# Patient Record
Sex: MALE | Race: WHITE | NOT HISPANIC OR LATINO
[De-identification: names, ages, dates, MRNs, and addresses within clinical notes are randomized per-mention and may not be internally consistent; named-entity substitution may affect disease eponyms.]

---

## 2020-12-29 ENCOUNTER — RESULT REVIEW (OUTPATIENT)
Age: 55
End: 2020-12-29

## 2020-12-29 PROCEDURE — 88321 CONSLTJ&REPRT SLD PREP ELSWR: CPT

## 2021-01-11 PROBLEM — Z00.00 ENCOUNTER FOR PREVENTIVE HEALTH EXAMINATION: Status: ACTIVE | Noted: 2021-01-11

## 2021-01-12 ENCOUNTER — APPOINTMENT (OUTPATIENT)
Dept: OTOLARYNGOLOGY | Facility: CLINIC | Age: 56
End: 2021-01-12

## 2021-01-12 ENCOUNTER — APPOINTMENT (OUTPATIENT)
Dept: OTOLARYNGOLOGY | Facility: CLINIC | Age: 56
End: 2021-01-12
Payer: COMMERCIAL

## 2021-01-12 VITALS
HEIGHT: 70 IN | HEART RATE: 76 BPM | SYSTOLIC BLOOD PRESSURE: 121 MMHG | WEIGHT: 175 LBS | BODY MASS INDEX: 25.05 KG/M2 | DIASTOLIC BLOOD PRESSURE: 77 MMHG

## 2021-01-12 VITALS — TEMPERATURE: 97.3 F

## 2021-01-12 PROCEDURE — 99204 OFFICE O/P NEW MOD 45 MIN: CPT

## 2021-01-12 PROCEDURE — 99072 ADDL SUPL MATRL&STAF TM PHE: CPT

## 2021-01-12 NOTE — PHYSICAL EXAM
[Midline] : trachea located in midline position [Normal] : no rashes [de-identified] : 3 cm R tonsil mass which has a central ulceration, probably from the biopsy. Lesion extends submucosally to the uvula and soft palate and laterally to the medial aspect of the retromolar trigone. No extension into the BOT.

## 2021-01-12 NOTE — HISTORY OF PRESENT ILLNESS
[de-identified] : 55 yro pt referred by Dr. Zazueta for HPV-related Right tonsil SCC. This was found on biopsy done 12/29/2020. \par CT neck from [facility] 12/18/2020 showed masslike enlargement of the R palatine tonsil measuring approx 2.6 x 2.6 x 2.3cm partial effacement of the R parapharyngeal space and protrusion of R tonsil into the oropharynx. Increased number of subcentimeter lymph nodes. \par Pt was c/o sore throat and Right earache and was on antibiotics with limited relief since November 2019. Finally went to ENT. Today pt still with these symptoms. Also with dysphagia and is on soft diet but weight is stable. As per wife, voice is lower since biopsy.  No dyspnea, fever, chills, cough. Weight stable. \par Pt quite smoking 22 years ago, was smoking 1pack/day for 15 years.

## 2021-01-12 NOTE — REASON FOR VISIT
[Initial Evaluation] : an initial evaluation for [Spouse] : spouse [FreeTextEntry2] : Right tonsil SCC

## 2021-01-14 ENCOUNTER — NON-APPOINTMENT (OUTPATIENT)
Age: 56
End: 2021-01-14

## 2021-01-23 ENCOUNTER — TRANSCRIPTION ENCOUNTER (OUTPATIENT)
Age: 56
End: 2021-01-23

## 2021-01-23 ENCOUNTER — OUTPATIENT (OUTPATIENT)
Dept: OUTPATIENT SERVICES | Facility: HOSPITAL | Age: 56
LOS: 1 days | Discharge: HOME | End: 2021-01-23

## 2021-01-23 DIAGNOSIS — C09.9 MALIGNANT NEOPLASM OF TONSIL, UNSPECIFIED: ICD-10-CM

## 2021-01-26 ENCOUNTER — OUTPATIENT (OUTPATIENT)
Dept: OUTPATIENT SERVICES | Facility: HOSPITAL | Age: 56
LOS: 1 days | Discharge: HOME | End: 2021-01-26
Payer: COMMERCIAL

## 2021-01-26 ENCOUNTER — APPOINTMENT (OUTPATIENT)
Dept: RADIATION ONCOLOGY | Facility: CLINIC | Age: 56
End: 2021-01-26
Payer: COMMERCIAL

## 2021-01-26 VITALS — BODY MASS INDEX: 25.22 KG/M2 | WEIGHT: 175.8 LBS

## 2021-01-26 DIAGNOSIS — T14.8XXA OTHER INJURY OF UNSPECIFIED BODY REGION, INITIAL ENCOUNTER: ICD-10-CM

## 2021-01-26 DIAGNOSIS — Z80.0 FAMILY HISTORY OF MALIGNANT NEOPLASM OF DIGESTIVE ORGANS: ICD-10-CM

## 2021-01-26 DIAGNOSIS — Z78.9 OTHER SPECIFIED HEALTH STATUS: ICD-10-CM

## 2021-01-26 DIAGNOSIS — Z87.891 PERSONAL HISTORY OF NICOTINE DEPENDENCE: ICD-10-CM

## 2021-01-26 DIAGNOSIS — Z80.42 FAMILY HISTORY OF MALIGNANT NEOPLASM OF PROSTATE: ICD-10-CM

## 2021-01-26 PROCEDURE — 77263 THER RADIOLOGY TX PLNG CPLX: CPT

## 2021-01-26 PROCEDURE — 92511 NASOPHARYNGOSCOPY: CPT

## 2021-01-26 PROCEDURE — 99205 OFFICE O/P NEW HI 60 MIN: CPT

## 2021-01-26 PROCEDURE — 99072 ADDL SUPL MATRL&STAF TM PHE: CPT

## 2021-01-26 NOTE — PHYSICAL EXAM
[Normal] : oriented to person, place and time, the affect was normal, the mood was normal and not anxious [de-identified] :  3cm ulcerative mass in right tonsillar fossa.

## 2021-01-26 NOTE — LETTER CLOSING
[Consult Closing:] : Thank you for allowing me to participate in the care of this patient.  If you have any questions, please do not hesitate to contact me. [Sincerely yours,] : Sincerely yours, [FreeTextEntry3] : Yassine Valenzuela MD\par Physician in Chief\par Department of Radiation Medicine\par [facility] [place]\par [facility] [place]\par \par  of Radiation Medicine\par [facility]\par at  [facility]\par \par Radiation \par [facility]/\par [facility] [place]\par [address]\par [place], [place] [address]\par \par Tel: [phone]\par Fax: [phone]\par

## 2021-01-26 NOTE — VITALS
[Least Pain Intensity: 0/10] : 0/10 [Pain Description/Quality: ___] : Pain description/quality: [unfilled] [Pain Duration: ___] : Pain duration: [unfilled] [Pain Location: ___] : Pain Location: [unfilled] [OTC] : OTC [80: Normal activity with effort; some signs or symptoms of disease.] : 80: Normal activity with effort; some signs or symptoms of disease.  [ECOG Performance Status: 1 - Restricted in physically strenuous activity but ambulatory and able to carry out work of a light or sedentary nature] : Performance Status: 1 - Restricted in physically strenuous activity but ambulatory and able to carry out work of a light or sedentary nature, e.g., light house work, office work [Maximal Pain Intensity: 3/10] : 3/10 [Pain Interferes with ADLs] : Pain does not interfere with activities of daily living

## 2021-01-26 NOTE — LETTER GREETING
[Dear Doctor] : Dear Doctor, [Consult Letter:] : Your patient, [unfilled] was seen in my office today for consultation. [Please see my note below.] : Please see my note below. [FreeTextEntry2] : Michael De La Cruz MD\par Abbi Ramon MD\par

## 2021-01-26 NOTE — DISEASE MANAGEMENT
[Clinical] : TNM Stage: c [II] : II [FreeTextEntry4] : HPV related squamous cell carcinoma [TTNM] : 2 [MTNM] : 0 [NTNM] : 2 [de-identified] : 7000cGy [de-identified] : tonsil / necks

## 2021-01-26 NOTE — HISTORY OF PRESENT ILLNESS
[FreeTextEntry1] :  This 55 year-old man is being seen for radiation medicine consultation.  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  \par \par The patient presented to Dr. Zazueta with complaints of sore throat and right ear ache since 11/2019 for which antibiotics provided limited relief.  \par \par CT neck 12/18/2020 ([facility]):\par masslike enlargement of the Right palatine tonsil measuring approximately 2.6 x 2.6 x 2.3 cm,  partial effacement of the Right parapharyngeal space and protrusion of Right tonsil into the oropharynx.  Increased number of subcentimeter lymph nodes. \par  \par Mr. Larson was referred to Dr. De La Cruz who performed biopsy.  Patient also reported dysphagia and is on soft diet but weight is stable. As per wife, voice is lower since biopsy.  Denies dyspnea, fever, chills, cough.\par \par \par

## 2021-01-26 NOTE — PROCEDURE
[Lesion] : lesion identified by mirror examination needing further evaluation [Topical Lidocaine] : topical lidocaine [Flexible Endoscope] : examined with the flexible endoscope [Serial Number: ___] : Serial Number: [unfilled] [Photographs Taken] : photographs taken [Lesion(s)] : lesion(s) [Normal] : the true vocal cords ~T were normal [de-identified] : right tonsillar ulcerative mass

## 2021-01-26 NOTE — REVIEW OF SYSTEMS
[Dysphagia] : no dysphagia [Hoarseness] : no hoarseness [Odynophagia] : no odynophagia [Mucosal Pain] : no mucosal pain [Abdominal Pain] : no abdominal pain [Negative] : Neurological [FreeTextEntry4] : mildly sore throat radiates to right ear  [FreeTextEntry7] : Hx stomach ulcer [FreeTextEntry9] : multiple orthopedic surgeries, recent torn right shoulder ligament [de-identified] : claustrophobia

## 2021-01-27 ENCOUNTER — RESULT REVIEW (OUTPATIENT)
Age: 56
End: 2021-01-27

## 2021-01-27 ENCOUNTER — APPOINTMENT (OUTPATIENT)
Dept: HEMATOLOGY ONCOLOGY | Facility: CLINIC | Age: 56
End: 2021-01-27
Payer: COMMERCIAL

## 2021-01-27 VITALS
SYSTOLIC BLOOD PRESSURE: 120 MMHG | OXYGEN SATURATION: 98 % | HEIGHT: 70 IN | BODY MASS INDEX: 25.2 KG/M2 | WEIGHT: 176 LBS | DIASTOLIC BLOOD PRESSURE: 77 MMHG | TEMPERATURE: 98.7 F | HEART RATE: 64 BPM

## 2021-01-27 LAB
BASOPHILS # BLD AUTO: 0.1 K/UL — SIGNIFICANT CHANGE UP (ref 0–0.2)
BASOPHILS NFR BLD AUTO: 0.9 % — SIGNIFICANT CHANGE UP (ref 0–2)
EOSINOPHIL # BLD AUTO: 0.2 K/UL — SIGNIFICANT CHANGE UP (ref 0–0.5)
EOSINOPHIL NFR BLD AUTO: 2.9 % — SIGNIFICANT CHANGE UP (ref 0–6)
HCT VFR BLD CALC: 45.9 % — SIGNIFICANT CHANGE UP (ref 39–50)
HGB BLD-MCNC: 14.7 G/DL — SIGNIFICANT CHANGE UP (ref 13–17)
LYMPHOCYTES # BLD AUTO: 1.8 K/UL — SIGNIFICANT CHANGE UP (ref 1–3.3)
LYMPHOCYTES # BLD AUTO: 26.4 % — SIGNIFICANT CHANGE UP (ref 13–44)
MCHC RBC-ENTMCNC: 28.7 PG — SIGNIFICANT CHANGE UP (ref 27–34)
MCHC RBC-ENTMCNC: 32.1 G/DL — SIGNIFICANT CHANGE UP (ref 32–36)
MCV RBC AUTO: 89.3 FL — SIGNIFICANT CHANGE UP (ref 80–100)
MONOCYTES # BLD AUTO: 0.5 K/UL — SIGNIFICANT CHANGE UP (ref 0–0.9)
MONOCYTES NFR BLD AUTO: 7.9 % — SIGNIFICANT CHANGE UP (ref 2–14)
NEUTROPHILS # BLD AUTO: 4.1 K/UL — SIGNIFICANT CHANGE UP (ref 1.8–7.4)
NEUTROPHILS NFR BLD AUTO: 61.9 % — SIGNIFICANT CHANGE UP (ref 43–77)
PLATELET # BLD AUTO: 182 K/UL — SIGNIFICANT CHANGE UP (ref 150–400)
RBC # BLD: 5.14 M/UL — SIGNIFICANT CHANGE UP (ref 4.2–5.8)
RBC # FLD: 12.6 % — SIGNIFICANT CHANGE UP (ref 10.3–14.5)
SURGICAL PATHOLOGY STUDY: SIGNIFICANT CHANGE UP
WBC # BLD: 6.7 K/UL — SIGNIFICANT CHANGE UP (ref 3.8–10.5)
WBC # FLD AUTO: 6.7 K/UL — SIGNIFICANT CHANGE UP (ref 3.8–10.5)

## 2021-01-27 PROCEDURE — 99072 ADDL SUPL MATRL&STAF TM PHE: CPT

## 2021-01-27 PROCEDURE — 99205 OFFICE O/P NEW HI 60 MIN: CPT

## 2021-01-27 NOTE — HISTORY OF PRESENT ILLNESS
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  Patient c/o a sore throat and right otalgia since 11/20 with no relief from antibiotics.  He saw ENT, Dr. Yassine Zazueta, and also c/o dysphagia.  Saw ENT following no improvement in Nov after antibiotics and had trial second antibiotics.   CT neck showed masslike enlargement of the right palatine tonsil measuring 2.6 x 2.6 x 2.3 cm with partial effacement of the right parapharyngeal space and protrusion of the right tonsil into the oropharynx.  Increased number of B/L cervical LNs measuring less than 1 cm.  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  LVI and PNI not identified.  He saw Dr. Michael De La Cruz where a physical exam showed a 3 cm R tonsil mass which has a central ulceration, probably from the biopsy. Lesion extends submucosally to the uvula and soft palate and laterally to the medial aspect of the retromolar trigone. No extension into the BOT.  Staging PET pending. [de-identified] : He reports dull/ sharp pain right ear intermittent radiating to right side neck x 3 months.

## 2021-01-27 NOTE — RESULTS/DATA
[FreeTextEntry1] : 12/29/20 Pathology:\par HPV-related SCC.  LVI and PNI not identified \par \par 12/18/20 CT Neck:\par masslike enlargement of the right palatine tonsil measuring 2.6 x 2.6 x 2.3 cm with partial effacement of the right parapharyngeal space and protrusion of the right tonsil into the oropharynx.  Increased number of B/L cervical LNs measuring less than 1 cm.

## 2021-01-27 NOTE — CONSULT LETTER
[Dear  ___] : Dear  [unfilled], [Consult Letter:] : I had the pleasure of evaluating your patient, [unfilled]. [Please see my note below.] : Please see my note below. [Consult Closing:] : Thank you very much for allowing me to participate in the care of this patient.  If you have any questions, please do not hesitate to contact me. [Sincerely,] : Sincerely, [DrTamy  ___] : Dr. ROSA [FreeTextEntry3] : Dr. Abbi Ramon

## 2021-01-28 LAB
ALBUMIN SERPL ELPH-MCNC: 4.6 G/DL
ALP BLD-CCNC: 91 U/L
ALT SERPL-CCNC: 47 U/L
ANION GAP SERPL CALC-SCNC: 12 MMOL/L
AST SERPL-CCNC: 39 U/L
BILIRUB SERPL-MCNC: 0.8 MG/DL
BUN SERPL-MCNC: 21 MG/DL
CALCIUM SERPL-MCNC: 10 MG/DL
CHLORIDE SERPL-SCNC: 101 MMOL/L
CO2 SERPL-SCNC: 25 MMOL/L
CREAT SERPL-MCNC: 1.02 MG/DL
GLUCOSE SERPL-MCNC: 93 MG/DL
HBV CORE IGG+IGM SER QL: NONREACTIVE
HBV SURFACE AB SER QL: NONREACTIVE
HBV SURFACE AG SER QL: NONREACTIVE
HCV AB SER QL: NONREACTIVE
HCV S/CO RATIO: 0.12 S/CO
INR PPP: 0.96 RATIO
MAGNESIUM SERPL-MCNC: 2.2 MG/DL
POTASSIUM SERPL-SCNC: 4.5 MMOL/L
PROT SERPL-MCNC: 7.2 G/DL
PT BLD: 11.3 SEC
SODIUM SERPL-SCNC: 138 MMOL/L
T3FREE SERPL-MCNC: 3.23 PG/ML
T4 FREE SERPL-MCNC: 1.2 NG/DL

## 2021-01-29 ENCOUNTER — APPOINTMENT (OUTPATIENT)
Dept: GASTROENTEROLOGY | Facility: CLINIC | Age: 56
End: 2021-01-29
Payer: COMMERCIAL

## 2021-01-29 VITALS
WEIGHT: 176 LBS | SYSTOLIC BLOOD PRESSURE: 120 MMHG | DIASTOLIC BLOOD PRESSURE: 70 MMHG | TEMPERATURE: 98 F | OXYGEN SATURATION: 97 % | BODY MASS INDEX: 25.2 KG/M2 | RESPIRATION RATE: 15 BRPM | HEIGHT: 70 IN | HEART RATE: 60 BPM

## 2021-01-29 DIAGNOSIS — R13.12 DYSPHAGIA, OROPHARYNGEAL PHASE: ICD-10-CM

## 2021-01-29 PROCEDURE — 99204 OFFICE O/P NEW MOD 45 MIN: CPT

## 2021-01-29 PROCEDURE — 99072 ADDL SUPL MATRL&STAF TM PHE: CPT

## 2021-01-29 RX ORDER — LEVOFLOXACIN 500 MG/1
500 TABLET, FILM COATED ORAL
Qty: 10 | Refills: 0 | Status: DISCONTINUED | COMMUNITY
Start: 2020-11-05

## 2021-01-29 NOTE — CONSULT LETTER
[Dear  ___] : Dear  [unfilled], [Consult Letter:] : I had the pleasure of evaluating your patient, [unfilled]. [Please see my note below.] : Please see my note below. [Consult Closing:] : Thank you very much for allowing me to participate in the care of this patient.  If you have any questions, please do not hesitate to contact me. [Sincerely,] : Sincerely, [FreeTextEntry1] : Squamous cell CA of the right tonsil with cervical adenopathy bilateral. Already with pharyngeal dysphagia. Reasonable PEG candidate. Offered same. Explained procedure. Considering. [FreeTextEntry3] : Kyle Sarabia MD FACG\par Diplomate American Board of Internal Medicine and Gastroenterolgy\par [facility]\par

## 2021-01-29 NOTE — PHYSICAL EXAM
[General Appearance - Alert] : alert [General Appearance - In No Acute Distress] : in no acute distress [Sclera] : the sclera and conjunctiva were normal [PERRL With Normal Accommodation] : pupils were equal in size, round, and reactive to light [Extraocular Movements] : extraocular movements were intact [Outer Ear] : the ears and nose were normal in appearance [Oropharynx] : the oropharynx was normal [Neck Cervical Mass (___cm)] : no neck mass was observed [Neck Appearance] : the appearance of the neck was normal [Jugular Venous Distention Increased] : there was no jugular-venous distention [Thyroid Diffuse Enlargement] : the thyroid was not enlarged [Thyroid Nodule] : there were no palpable thyroid nodules [Auscultation Breath Sounds / Voice Sounds] : lungs were clear to auscultation bilaterally [Heart Rate And Rhythm] : heart rate was normal and rhythm regular [Heart Sounds] : normal S1 and S2 [Heart Sounds Gallop] : no gallops [Murmurs] : no murmurs [Heart Sounds Pericardial Friction Rub] : no pericardial rub [Bowel Sounds] : normal bowel sounds [Abdomen Soft] : soft [Abdomen Tenderness] : non-tender [Abdomen Mass (___ Cm)] : no abdominal mass palpated [No CVA Tenderness] : no ~M costovertebral angle tenderness [No Spinal Tenderness] : no spinal tenderness [Abnormal Walk] : normal gait [Nail Clubbing] : no clubbing  or cyanosis of the fingernails [Musculoskeletal - Swelling] : no joint swelling seen [Motor Tone] : muscle strength and tone were normal [Skin Color & Pigmentation] : normal skin color and pigmentation [Skin Turgor] : normal skin turgor [] : no rash [FreeTextEntry1] : No adenopathy. No HSM.

## 2021-01-29 NOTE — ASSESSMENT
[FreeTextEntry1] : Stage III head and neck/tonsillar  squamous cell carcinoma. Not a surgical candidate. Awaiting radiochemotherapy. Modest dysphagia currently. Expect treatment induced mucositis.  Reasonable candidate for a prophylactic PEG.\par Explained the procedure, its indications risks and benefits. Patient understands and is considering his options. Current blood work is acceptable. Patient is allergic to penicillin; therefore clindamycin would be the likely prophylactic antibiotic.\par Patient informed about care of the G-tube and it's eventual removal.\par Patient wishes to consider his options before committing to the endoscopy/PEG.  \par Call back as needed.

## 2021-01-29 NOTE — HISTORY OF PRESENT ILLNESS
[FreeTextEntry1] : 55-year-old white male distant smoker. Hasn't smoked in the past 20 years. Develop right tonsillar pain and flow volume esophageal dysphagia about mid November appeared response to antibiotics. Biopsy proven to be squamous cell carcinoma. Low T. stage III. Recent CAT scan suggested bilateral lymphadenopathy along with right tonsillar enlargement recent blood work was normal. Hepatitis profiles were negative. CAT scan completed yesterday shows increased uptake in the right tonsil and bilateral cervical adenopathy noted. No distant activity.\par Planned for radiochemotherapy.\par Diet has been adjusted to soft and maintaining adequate hydration and nutrition. No weight loss. No bleeding. No neck swelling. No prior abdominal surgery.

## 2021-02-02 ENCOUNTER — NON-APPOINTMENT (OUTPATIENT)
Age: 56
End: 2021-02-02

## 2021-02-05 PROCEDURE — 77470 SPECIAL RADIATION TREATMENT: CPT | Mod: 26

## 2021-02-10 PROCEDURE — 77338 DESIGN MLC DEVICE FOR IMRT: CPT | Mod: 26

## 2021-02-10 PROCEDURE — 77300 RADIATION THERAPY DOSE PLAN: CPT | Mod: 26

## 2021-02-10 PROCEDURE — 77301 RADIOTHERAPY DOSE PLAN IMRT: CPT | Mod: 26

## 2021-02-17 ENCOUNTER — APPOINTMENT (OUTPATIENT)
Dept: HEMATOLOGY ONCOLOGY | Facility: CLINIC | Age: 56
End: 2021-02-17

## 2021-02-17 ENCOUNTER — LABORATORY RESULT (OUTPATIENT)
Age: 56
End: 2021-02-17

## 2021-02-22 ENCOUNTER — RESULT REVIEW (OUTPATIENT)
Age: 56
End: 2021-02-22

## 2021-02-22 ENCOUNTER — OUTPATIENT (OUTPATIENT)
Dept: OUTPATIENT SERVICES | Facility: HOSPITAL | Age: 56
LOS: 1 days | Discharge: HOME | End: 2021-02-22
Payer: COMMERCIAL

## 2021-02-22 ENCOUNTER — APPOINTMENT (OUTPATIENT)
Dept: HEMATOLOGY ONCOLOGY | Facility: CLINIC | Age: 56
End: 2021-02-22

## 2021-02-22 DIAGNOSIS — C09.9 MALIGNANT NEOPLASM OF TONSIL, UNSPECIFIED: ICD-10-CM

## 2021-02-22 LAB
BASOPHILS # BLD AUTO: 0.1 K/UL — SIGNIFICANT CHANGE UP (ref 0–0.2)
BASOPHILS NFR BLD AUTO: 1.6 % — SIGNIFICANT CHANGE UP (ref 0–2)
EOSINOPHIL # BLD AUTO: 0.3 K/UL — SIGNIFICANT CHANGE UP (ref 0–0.5)
EOSINOPHIL NFR BLD AUTO: 4.8 % — SIGNIFICANT CHANGE UP (ref 0–6)
HCT VFR BLD CALC: 47.7 % — SIGNIFICANT CHANGE UP (ref 39–50)
HGB BLD-MCNC: 15.7 G/DL — SIGNIFICANT CHANGE UP (ref 13–17)
LYMPHOCYTES # BLD AUTO: 2 K/UL — SIGNIFICANT CHANGE UP (ref 1–3.3)
LYMPHOCYTES # BLD AUTO: 30.8 % — SIGNIFICANT CHANGE UP (ref 13–44)
MCHC RBC-ENTMCNC: 28.6 PG — SIGNIFICANT CHANGE UP (ref 27–34)
MCHC RBC-ENTMCNC: 32.9 G/DL — SIGNIFICANT CHANGE UP (ref 32–36)
MCV RBC AUTO: 87 FL — SIGNIFICANT CHANGE UP (ref 80–100)
MONOCYTES # BLD AUTO: 0.6 K/UL — SIGNIFICANT CHANGE UP (ref 0–0.9)
MONOCYTES NFR BLD AUTO: 9.5 % — SIGNIFICANT CHANGE UP (ref 2–14)
NEUTROPHILS # BLD AUTO: 3.5 K/UL — SIGNIFICANT CHANGE UP (ref 1.8–7.4)
NEUTROPHILS NFR BLD AUTO: 53.3 % — SIGNIFICANT CHANGE UP (ref 43–77)
PLATELET # BLD AUTO: 184 K/UL — SIGNIFICANT CHANGE UP (ref 150–400)
RBC # BLD: 5.48 M/UL — SIGNIFICANT CHANGE UP (ref 4.2–5.8)
RBC # FLD: 12 % — SIGNIFICANT CHANGE UP (ref 10.3–14.5)
WBC # BLD: 6.5 K/UL — SIGNIFICANT CHANGE UP (ref 3.8–10.5)
WBC # FLD AUTO: 6.5 K/UL — SIGNIFICANT CHANGE UP (ref 3.8–10.5)

## 2021-02-23 ENCOUNTER — APPOINTMENT (OUTPATIENT)
Age: 56
End: 2021-02-23

## 2021-02-23 ENCOUNTER — LABORATORY RESULT (OUTPATIENT)
Age: 56
End: 2021-02-23

## 2021-02-23 PROCEDURE — 77387B: CUSTOM | Mod: 26

## 2021-02-23 PROCEDURE — 93010 ELECTROCARDIOGRAM REPORT: CPT

## 2021-02-24 DIAGNOSIS — R11.2 NAUSEA WITH VOMITING, UNSPECIFIED: ICD-10-CM

## 2021-02-24 DIAGNOSIS — C76.0 MALIGNANT NEOPLASM OF HEAD, FACE AND NECK: ICD-10-CM

## 2021-02-24 DIAGNOSIS — Z51.11 ENCOUNTER FOR ANTINEOPLASTIC CHEMOTHERAPY: ICD-10-CM

## 2021-02-24 PROCEDURE — 77387B: CUSTOM | Mod: 26

## 2021-02-25 PROCEDURE — 77387B: CUSTOM | Mod: 26

## 2021-02-26 PROCEDURE — 77387B: CUSTOM | Mod: 26

## 2021-03-01 ENCOUNTER — APPOINTMENT (OUTPATIENT)
Dept: HEMATOLOGY ONCOLOGY | Facility: CLINIC | Age: 56
End: 2021-03-01

## 2021-03-01 ENCOUNTER — RESULT REVIEW (OUTPATIENT)
Age: 56
End: 2021-03-01

## 2021-03-01 ENCOUNTER — NON-APPOINTMENT (OUTPATIENT)
Age: 56
End: 2021-03-01

## 2021-03-01 VITALS
HEART RATE: 104 BPM | BODY MASS INDEX: 25.05 KG/M2 | OXYGEN SATURATION: 98 % | SYSTOLIC BLOOD PRESSURE: 149 MMHG | DIASTOLIC BLOOD PRESSURE: 86 MMHG | HEIGHT: 70 IN | WEIGHT: 175 LBS | RESPIRATION RATE: 15 BRPM | TEMPERATURE: 98 F

## 2021-03-01 LAB
BASOPHILS # BLD AUTO: 0.1 K/UL — SIGNIFICANT CHANGE UP (ref 0–0.2)
BASOPHILS NFR BLD AUTO: 0.8 % — SIGNIFICANT CHANGE UP (ref 0–2)
EOSINOPHIL # BLD AUTO: 0.2 K/UL — SIGNIFICANT CHANGE UP (ref 0–0.5)
EOSINOPHIL NFR BLD AUTO: 2.4 % — SIGNIFICANT CHANGE UP (ref 0–6)
HCT VFR BLD CALC: 45.8 % — SIGNIFICANT CHANGE UP (ref 39–50)
HGB BLD-MCNC: 15.1 G/DL — SIGNIFICANT CHANGE UP (ref 13–17)
LYMPHOCYTES # BLD AUTO: 1.3 K/UL — SIGNIFICANT CHANGE UP (ref 1–3.3)
LYMPHOCYTES # BLD AUTO: 17.8 % — SIGNIFICANT CHANGE UP (ref 13–44)
MCHC RBC-ENTMCNC: 27.9 PG — SIGNIFICANT CHANGE UP (ref 27–34)
MCHC RBC-ENTMCNC: 33 G/DL — SIGNIFICANT CHANGE UP (ref 32–36)
MCV RBC AUTO: 84.4 FL — SIGNIFICANT CHANGE UP (ref 80–100)
MONOCYTES # BLD AUTO: 0.6 K/UL — SIGNIFICANT CHANGE UP (ref 0–0.9)
MONOCYTES NFR BLD AUTO: 7.4 % — SIGNIFICANT CHANGE UP (ref 2–14)
NEUTROPHILS # BLD AUTO: 5.3 K/UL — SIGNIFICANT CHANGE UP (ref 1.8–7.4)
NEUTROPHILS NFR BLD AUTO: 71.6 % — SIGNIFICANT CHANGE UP (ref 43–77)
PLATELET # BLD AUTO: 176 K/UL — SIGNIFICANT CHANGE UP (ref 150–400)
RBC # BLD: 5.43 M/UL — SIGNIFICANT CHANGE UP (ref 4.2–5.8)
RBC # FLD: 11.5 % — SIGNIFICANT CHANGE UP (ref 10.3–14.5)
WBC # BLD: 7.4 K/UL — SIGNIFICANT CHANGE UP (ref 3.8–10.5)
WBC # FLD AUTO: 7.4 K/UL — SIGNIFICANT CHANGE UP (ref 3.8–10.5)

## 2021-03-01 PROCEDURE — 77427 RADIATION TX MANAGEMENT X5: CPT

## 2021-03-01 PROCEDURE — 77387 GUIDANCE FOR RADJ TX DLVR: CPT | Mod: 26

## 2021-03-01 NOTE — PHYSICAL EXAM
[Normal] : no respiratory distress, lungs were clear to auscultation bilaterally [de-identified] : tachycardic

## 2021-03-01 NOTE — DISEASE MANAGEMENT
[Clinical] : TNM Stage: c [II] : II [FreeTextEntry4] : HPV related squamous cell carcinoma [TTNM] : 2 [NTNM] : 2 [MTNM] : 0 [de-identified] : 1000 [de-identified] : 7000cGy [de-identified] : tonsil / necks

## 2021-03-01 NOTE — VITALS
[Maximal Pain Intensity: 2/10] : 2/10 [Least Pain Intensity: 2/10] : 2/10 [Pain Description/Quality: ___] : Pain description/quality: [unfilled] [Pain Duration: ___] : Pain duration: [unfilled] [Pain Location: ___] : Pain Location: [unfilled] [Pain Interferes with ADLs] : Pain interferes with activities of daily living. [OTC] : OTC [80: Normal activity with effort; some signs or symptoms of disease.] : 80: Normal activity with effort; some signs or symptoms of disease.

## 2021-03-01 NOTE — REVIEW OF SYSTEMS
[Dysphagia: Grade 0] : Dysphagia: Grade 0 [Edema Limbs: Grade 0] : Edema Limbs: Grade 0  [Fatigue: Grade 1 - Fatigue relieved by rest] : Fatigue: Grade 1 - Fatigue relieved by rest [Localized Edema: Grade 0] : Localized Edema: Grade 0  [Neck Edema: Grade 0] : Neck Edema: Grade 0 [Xerostomia: Grade 0] : Xerostomia: Grade 0 [Oral Pain: Grade 0] : Oral Pain: Grade 0 [Dysgeusia: Grade 1- Altered taste but no change in diet] : Dysgeusia: Grade 1 - Altered taste but no change in diet [Headache: Grade 1 - Mild pain] : Headache: Grade 1 - Mild pain [Skin Hyperpigmentation: Grade 0] : Skin Hyperpigmentation: Grade 0 [Dermatitis Radiation: Grade 0] : Dermatitis Radiation: Grade 0

## 2021-03-02 ENCOUNTER — LABORATORY RESULT (OUTPATIENT)
Age: 56
End: 2021-03-02

## 2021-03-02 ENCOUNTER — APPOINTMENT (OUTPATIENT)
Age: 56
End: 2021-03-02

## 2021-03-02 LAB — TSH SERPL-ACNC: 0.95 UIU/ML

## 2021-03-02 PROCEDURE — 77387B: CUSTOM | Mod: 26

## 2021-03-03 PROCEDURE — 77387B: CUSTOM | Mod: 26

## 2021-03-04 PROCEDURE — 77387B: CUSTOM | Mod: 26

## 2021-03-05 PROCEDURE — 77387B: CUSTOM | Mod: 26

## 2021-03-08 ENCOUNTER — NON-APPOINTMENT (OUTPATIENT)
Age: 56
End: 2021-03-08

## 2021-03-08 ENCOUNTER — RESULT REVIEW (OUTPATIENT)
Age: 56
End: 2021-03-08

## 2021-03-08 ENCOUNTER — APPOINTMENT (OUTPATIENT)
Dept: HEMATOLOGY ONCOLOGY | Facility: CLINIC | Age: 56
End: 2021-03-08

## 2021-03-08 VITALS
WEIGHT: 173.2 LBS | HEART RATE: 87 BPM | SYSTOLIC BLOOD PRESSURE: 125 MMHG | RESPIRATION RATE: 16 BRPM | DIASTOLIC BLOOD PRESSURE: 80 MMHG | OXYGEN SATURATION: 99 % | BODY MASS INDEX: 24.85 KG/M2

## 2021-03-08 LAB
BASOPHILS # BLD AUTO: 0.1 K/UL — SIGNIFICANT CHANGE UP (ref 0–0.2)
BASOPHILS NFR BLD AUTO: 0.9 % — SIGNIFICANT CHANGE UP (ref 0–2)
EOSINOPHIL # BLD AUTO: 0.1 K/UL — SIGNIFICANT CHANGE UP (ref 0–0.5)
EOSINOPHIL NFR BLD AUTO: 1.6 % — SIGNIFICANT CHANGE UP (ref 0–6)
HCT VFR BLD CALC: 42.9 % — SIGNIFICANT CHANGE UP (ref 39–50)
HGB BLD-MCNC: 14.5 G/DL — SIGNIFICANT CHANGE UP (ref 13–17)
LYMPHOCYTES # BLD AUTO: 0.8 K/UL — LOW (ref 1–3.3)
LYMPHOCYTES # BLD AUTO: 11.2 % — LOW (ref 13–44)
MCHC RBC-ENTMCNC: 28.2 PG — SIGNIFICANT CHANGE UP (ref 27–34)
MCHC RBC-ENTMCNC: 33.8 G/DL — SIGNIFICANT CHANGE UP (ref 32–36)
MCV RBC AUTO: 83.3 FL — SIGNIFICANT CHANGE UP (ref 80–100)
MONOCYTES # BLD AUTO: 0.5 K/UL — SIGNIFICANT CHANGE UP (ref 0–0.9)
MONOCYTES NFR BLD AUTO: 6.7 % — SIGNIFICANT CHANGE UP (ref 2–14)
NEUTROPHILS # BLD AUTO: 5.6 K/UL — SIGNIFICANT CHANGE UP (ref 1.8–7.4)
NEUTROPHILS NFR BLD AUTO: 79.6 % — HIGH (ref 43–77)
PLATELET # BLD AUTO: 211 K/UL — SIGNIFICANT CHANGE UP (ref 150–400)
RBC # BLD: 5.15 M/UL — SIGNIFICANT CHANGE UP (ref 4.2–5.8)
RBC # FLD: 11.3 % — SIGNIFICANT CHANGE UP (ref 10.3–14.5)
WBC # BLD: 7.1 K/UL — SIGNIFICANT CHANGE UP (ref 3.8–10.5)
WBC # FLD AUTO: 7.1 K/UL — SIGNIFICANT CHANGE UP (ref 3.8–10.5)

## 2021-03-08 PROCEDURE — 77387 GUIDANCE FOR RADJ TX DLVR: CPT | Mod: 26

## 2021-03-08 PROCEDURE — 77427 RADIATION TX MANAGEMENT X5: CPT

## 2021-03-09 ENCOUNTER — LABORATORY RESULT (OUTPATIENT)
Age: 56
End: 2021-03-09

## 2021-03-09 ENCOUNTER — APPOINTMENT (OUTPATIENT)
Age: 56
End: 2021-03-09

## 2021-03-09 PROCEDURE — 77387B: CUSTOM | Mod: 26

## 2021-03-10 DIAGNOSIS — E86.0 DEHYDRATION: ICD-10-CM

## 2021-03-10 PROCEDURE — 77387B: CUSTOM | Mod: 26

## 2021-03-11 PROCEDURE — 77387B: CUSTOM | Mod: 26

## 2021-03-12 ENCOUNTER — APPOINTMENT (OUTPATIENT)
Dept: HEMATOLOGY ONCOLOGY | Facility: CLINIC | Age: 56
End: 2021-03-12
Payer: COMMERCIAL

## 2021-03-12 VITALS
TEMPERATURE: 97.7 F | OXYGEN SATURATION: 97 % | DIASTOLIC BLOOD PRESSURE: 77 MMHG | HEART RATE: 72 BPM | WEIGHT: 172 LBS | BODY MASS INDEX: 24.62 KG/M2 | SYSTOLIC BLOOD PRESSURE: 122 MMHG | HEIGHT: 70 IN

## 2021-03-12 PROCEDURE — 99072 ADDL SUPL MATRL&STAF TM PHE: CPT

## 2021-03-12 PROCEDURE — 77387B: CUSTOM | Mod: 26

## 2021-03-12 PROCEDURE — 99214 OFFICE O/P EST MOD 30 MIN: CPT

## 2021-03-12 NOTE — HISTORY OF PRESENT ILLNESS
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  Patient c/o a sore throat and right otalgia since 11/20 with no relief from antibiotics.  He saw ENT, Dr. Yassine Zazueta, and also c/o dysphagia.  Saw ENT following no improvement in Nov after antibiotics and had trial second antibiotics.   CT neck showed masslike enlargement of the right palatine tonsil measuring 2.6 x 2.6 x 2.3 cm with partial effacement of the right parapharyngeal space and protrusion of the right tonsil into the oropharynx.  Increased number of B/L cervical LNs measuring less than 1 cm.  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  LVI and PNI not identified.  He saw Dr. Michael De La Cruz where a physical exam showed a 3 cm R tonsil mass which has a central ulceration, probably from the biopsy. Lesion extends submucosally to the uvula and soft palate and laterally to the medial aspect of the retromolar trigone. No extension into the BOT.  Staging PET pending. [de-identified] : Patient presents on C3D4 CRT with weekly Cisplatin for SCC of the right tonsil.  + Odynophagia, severe recently. + Dysphagia. Eating soft food only. + Nausea C1, no emesis and no recent nausea. + Erythematous skin. + Thick excessive oral secretions.  + Xerostomia. + Mucositis. + Chronic tinnitus, slightly worse. + R otalgia less severe. No fevers, mild cough, no SOB.\par \par \par

## 2021-03-15 ENCOUNTER — APPOINTMENT (OUTPATIENT)
Dept: HEMATOLOGY ONCOLOGY | Facility: CLINIC | Age: 56
End: 2021-03-15

## 2021-03-15 ENCOUNTER — RESULT REVIEW (OUTPATIENT)
Age: 56
End: 2021-03-15

## 2021-03-15 ENCOUNTER — LABORATORY RESULT (OUTPATIENT)
Age: 56
End: 2021-03-15

## 2021-03-15 LAB
BASOPHILS # BLD AUTO: 0 K/UL — SIGNIFICANT CHANGE UP (ref 0–0.2)
BASOPHILS NFR BLD AUTO: 1 % — SIGNIFICANT CHANGE UP (ref 0–2)
EOSINOPHIL # BLD AUTO: 0.1 K/UL — SIGNIFICANT CHANGE UP (ref 0–0.5)
EOSINOPHIL NFR BLD AUTO: 1.6 % — SIGNIFICANT CHANGE UP (ref 0–6)
HCT VFR BLD CALC: 43 % — SIGNIFICANT CHANGE UP (ref 39–50)
HGB BLD-MCNC: 14.7 G/DL — SIGNIFICANT CHANGE UP (ref 13–17)
LYMPHOCYTES # BLD AUTO: 0.7 K/UL — LOW (ref 1–3.3)
LYMPHOCYTES # BLD AUTO: 15.9 % — SIGNIFICANT CHANGE UP (ref 13–44)
MCHC RBC-ENTMCNC: 28.4 PG — SIGNIFICANT CHANGE UP (ref 27–34)
MCHC RBC-ENTMCNC: 34.2 G/DL — SIGNIFICANT CHANGE UP (ref 32–36)
MCV RBC AUTO: 83 FL — SIGNIFICANT CHANGE UP (ref 80–100)
MONOCYTES # BLD AUTO: 0.4 K/UL — SIGNIFICANT CHANGE UP (ref 0–0.9)
MONOCYTES NFR BLD AUTO: 9.4 % — SIGNIFICANT CHANGE UP (ref 2–14)
NEUTROPHILS # BLD AUTO: 3.2 K/UL — SIGNIFICANT CHANGE UP (ref 1.8–7.4)
NEUTROPHILS NFR BLD AUTO: 72 % — SIGNIFICANT CHANGE UP (ref 43–77)
PLATELET # BLD AUTO: 158 K/UL — SIGNIFICANT CHANGE UP (ref 150–400)
RBC # BLD: 5.18 M/UL — SIGNIFICANT CHANGE UP (ref 4.2–5.8)
RBC # FLD: 11.6 % — SIGNIFICANT CHANGE UP (ref 10.3–14.5)
WBC # BLD: 4.4 K/UL — SIGNIFICANT CHANGE UP (ref 3.8–10.5)
WBC # FLD AUTO: 4.4 K/UL — SIGNIFICANT CHANGE UP (ref 3.8–10.5)

## 2021-03-15 PROCEDURE — 77387 GUIDANCE FOR RADJ TX DLVR: CPT | Mod: 26

## 2021-03-16 ENCOUNTER — APPOINTMENT (OUTPATIENT)
Age: 56
End: 2021-03-16

## 2021-03-16 ENCOUNTER — LABORATORY RESULT (OUTPATIENT)
Age: 56
End: 2021-03-16

## 2021-03-16 ENCOUNTER — APPOINTMENT (OUTPATIENT)
Dept: HEMATOLOGY ONCOLOGY | Facility: CLINIC | Age: 56
End: 2021-03-16

## 2021-03-16 ENCOUNTER — NON-APPOINTMENT (OUTPATIENT)
Age: 56
End: 2021-03-16

## 2021-03-16 VITALS
BODY MASS INDEX: 24.26 KG/M2 | WEIGHT: 169.5 LBS | HEART RATE: 86 BPM | OXYGEN SATURATION: 100 % | TEMPERATURE: 98 F | RESPIRATION RATE: 16 BRPM | DIASTOLIC BLOOD PRESSURE: 88 MMHG | HEIGHT: 70 IN | SYSTOLIC BLOOD PRESSURE: 144 MMHG

## 2021-03-16 PROCEDURE — 77427 RADIATION TX MANAGEMENT X5: CPT

## 2021-03-16 PROCEDURE — 77387B: CUSTOM | Mod: 26

## 2021-03-16 NOTE — DISEASE MANAGEMENT
[FreeTextEntry4] : HPV related squamous cell carcinoma [TTNM] : 2 [NTNM] : 2 [MTNM] : 0 [de-identified] : 9501 [de-identified] : 7000cGy [de-identified] : tonsil / necks

## 2021-03-17 PROCEDURE — 77387B: CUSTOM | Mod: 26

## 2021-03-18 ENCOUNTER — NON-APPOINTMENT (OUTPATIENT)
Age: 56
End: 2021-03-18

## 2021-03-18 PROCEDURE — 77387B: CUSTOM | Mod: 26

## 2021-03-19 PROCEDURE — 77387B: CUSTOM | Mod: 26

## 2021-03-22 ENCOUNTER — RESULT REVIEW (OUTPATIENT)
Age: 56
End: 2021-03-22

## 2021-03-22 ENCOUNTER — NON-APPOINTMENT (OUTPATIENT)
Age: 56
End: 2021-03-22

## 2021-03-22 ENCOUNTER — APPOINTMENT (OUTPATIENT)
Dept: GASTROENTEROLOGY | Facility: CLINIC | Age: 56
End: 2021-03-22

## 2021-03-22 ENCOUNTER — APPOINTMENT (OUTPATIENT)
Dept: HEMATOLOGY ONCOLOGY | Facility: CLINIC | Age: 56
End: 2021-03-22

## 2021-03-22 VITALS
OXYGEN SATURATION: 100 % | HEART RATE: 96 BPM | RESPIRATION RATE: 16 BRPM | WEIGHT: 165 LBS | SYSTOLIC BLOOD PRESSURE: 129 MMHG | DIASTOLIC BLOOD PRESSURE: 76 MMHG | BODY MASS INDEX: 23.68 KG/M2

## 2021-03-22 LAB
BASOPHILS # BLD AUTO: 0 K/UL — SIGNIFICANT CHANGE UP (ref 0–0.2)
BASOPHILS NFR BLD AUTO: 1.1 % — SIGNIFICANT CHANGE UP (ref 0–2)
EOSINOPHIL # BLD AUTO: 0.1 K/UL — SIGNIFICANT CHANGE UP (ref 0–0.5)
EOSINOPHIL NFR BLD AUTO: 1.8 % — SIGNIFICANT CHANGE UP (ref 0–6)
HCT VFR BLD CALC: 39.2 % — SIGNIFICANT CHANGE UP (ref 39–50)
HGB BLD-MCNC: 13.8 G/DL — SIGNIFICANT CHANGE UP (ref 13–17)
LYMPHOCYTES # BLD AUTO: 0.5 K/UL — LOW (ref 1–3.3)
LYMPHOCYTES # BLD AUTO: 12.8 % — LOW (ref 13–44)
MCHC RBC-ENTMCNC: 28.4 PG — SIGNIFICANT CHANGE UP (ref 27–34)
MCHC RBC-ENTMCNC: 35.2 G/DL — SIGNIFICANT CHANGE UP (ref 32–36)
MCV RBC AUTO: 81 FL — SIGNIFICANT CHANGE UP (ref 80–100)
MONOCYTES # BLD AUTO: 0.5 K/UL — SIGNIFICANT CHANGE UP (ref 0–0.9)
MONOCYTES NFR BLD AUTO: 11.6 % — SIGNIFICANT CHANGE UP (ref 2–14)
NEUTROPHILS # BLD AUTO: 3.1 K/UL — SIGNIFICANT CHANGE UP (ref 1.8–7.4)
NEUTROPHILS NFR BLD AUTO: 72.5 % — SIGNIFICANT CHANGE UP (ref 43–77)
PLATELET # BLD AUTO: 141 K/UL — LOW (ref 150–400)
RBC # BLD: 4.85 M/UL — SIGNIFICANT CHANGE UP (ref 4.2–5.8)
RBC # FLD: 11.6 % — SIGNIFICANT CHANGE UP (ref 10.3–14.5)
WBC # BLD: 4.2 K/UL — SIGNIFICANT CHANGE UP (ref 3.8–10.5)
WBC # FLD AUTO: 4.2 K/UL — SIGNIFICANT CHANGE UP (ref 3.8–10.5)

## 2021-03-22 PROCEDURE — 77387 GUIDANCE FOR RADJ TX DLVR: CPT | Mod: 26

## 2021-03-22 PROCEDURE — 77427 RADIATION TX MANAGEMENT X5: CPT

## 2021-03-22 NOTE — DISEASE MANAGEMENT
[Clinical] : TNM Stage: c [FreeTextEntry4] : HPV related squamous cell carcinoma [TTNM] : 2 [NTNM] : 2 [MTNM] : 0 [II] : II [de-identified] : 0211 [de-identified] : 7000cGy [de-identified] : tonsil / necks

## 2021-03-22 NOTE — VITALS
[Maximal Pain Intensity: 3/10] : 3/10 [Least Pain Intensity: 1/10] : 1/10 [Pain Description/Quality: ___] : Pain description/quality: [unfilled] [Pain Duration: ___] : Pain duration: [unfilled] [Pain Location: ___] : Pain Location: [unfilled] [Pain Interferes with ADLs] : Pain interferes with activities of daily living. [OTC] : OTC [Opioid] : opioid [80: Normal activity with effort; some signs or symptoms of disease.] : 80: Normal activity with effort; some signs or symptoms of disease.  [ECOG Performance Status: 2 - Ambulatory and capable of all self care but unable to carry out any work activities] : Performance Status: 2 - Ambulatory and capable of all self care but unable to carry out any work activities. Up and about more than 50% of waking hours

## 2021-03-22 NOTE — REVIEW OF SYSTEMS
[Dysphagia: Grade 0] : Dysphagia: Grade 0 [Nausea: Grade 0] : Nausea: Grade 0 [Vomiting: Grade 0] : Vomiting: Grade 0 [Edema Limbs: Grade 0] : Edema Limbs: Grade 0  [Fatigue: Grade 1 - Fatigue relieved by rest] : Fatigue: Grade 1 - Fatigue relieved by rest [Localized Edema: Grade 0] : Localized Edema: Grade 0  [Neck Edema: Grade 0] : Neck Edema: Grade 0 [Mucositis Oral: Grade 2 - Moderate pain; not interfering with oral intake; modified diet indicated] : Mucositis Oral: Grade 2 - Moderate pain; not interfering with oral intake; modified diet indicated [Xerostomia: Grade 2 - Moderate symptoms; oral intake alterations (e.g., copious water, other lubricants, diet limited to purees and/or soft, moist foods); unstimulated saliva 0.1 to 0.2 ml/min] : Xerostomia: Grade 2 - Moderate symptoms; oral intake alterations (e.g., copious water, other lubricants, diet limited to purees and/or soft, moist foods); unstimulated saliva 0.1 to 0.2 ml/min [Oral Pain: Grade 1 - Mild pain] : Oral Pain: Grade 1 - Mild pain [Dysgeusia: Grade 2 - Altered taste with change in diet (e.g., oral supplements); noxious or unpleasant taste; loss of taste] : Dysgeusia: Grade 2 - Altered taste with change in diet (e.g., oral supplements); noxious or unpleasant taste; loss of taste [Skin Hyperpigmentation: Grade 0] : Skin Hyperpigmentation: Grade 0 [Dermatitis Radiation: Grade 0] : Dermatitis Radiation: Grade 0

## 2021-03-23 ENCOUNTER — APPOINTMENT (OUTPATIENT)
Age: 56
End: 2021-03-23

## 2021-03-23 ENCOUNTER — LABORATORY RESULT (OUTPATIENT)
Age: 56
End: 2021-03-23

## 2021-03-23 PROCEDURE — 77387B: CUSTOM | Mod: 26

## 2021-03-24 ENCOUNTER — OUTPATIENT (OUTPATIENT)
Dept: OUTPATIENT SERVICES | Facility: HOSPITAL | Age: 56
LOS: 1 days | Discharge: HOME | End: 2021-03-24

## 2021-03-24 DIAGNOSIS — C09.9 MALIGNANT NEOPLASM OF TONSIL, UNSPECIFIED: ICD-10-CM

## 2021-03-24 PROCEDURE — 77387B: CUSTOM | Mod: 26

## 2021-03-25 PROCEDURE — 77387B: CUSTOM | Mod: 26

## 2021-03-26 ENCOUNTER — APPOINTMENT (OUTPATIENT)
Dept: HEMATOLOGY ONCOLOGY | Facility: CLINIC | Age: 56
End: 2021-03-26
Payer: COMMERCIAL

## 2021-03-26 VITALS
OXYGEN SATURATION: 98 % | BODY MASS INDEX: 23.56 KG/M2 | SYSTOLIC BLOOD PRESSURE: 112 MMHG | WEIGHT: 164.56 LBS | DIASTOLIC BLOOD PRESSURE: 75 MMHG | HEART RATE: 75 BPM | HEIGHT: 70 IN

## 2021-03-26 PROCEDURE — 77387B: CUSTOM | Mod: 26

## 2021-03-26 PROCEDURE — 99215 OFFICE O/P EST HI 40 MIN: CPT | Mod: 25

## 2021-03-26 PROCEDURE — 99072 ADDL SUPL MATRL&STAF TM PHE: CPT

## 2021-03-26 NOTE — HISTORY OF PRESENT ILLNESS
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  Patient c/o a sore throat and right otalgia since 11/20 with no relief from antibiotics.  He saw ENT, Dr. Yassine Zazueta, and also c/o dysphagia.  Saw ENT following no improvement in Nov after antibiotics and had trial second antibiotics.   CT neck showed masslike enlargement of the right palatine tonsil measuring 2.6 x 2.6 x 2.3 cm with partial effacement of the right parapharyngeal space and protrusion of the right tonsil into the oropharynx.  Increased number of B/L cervical LNs measuring less than 1 cm.  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  LVI and PNI not identified.  He saw Dr. Michael De La Cruz where a physical exam showed a 3 cm R tonsil mass which has a central ulceration, probably from the biopsy. Lesion extends submucosally to the uvula and soft palate and laterally to the medial aspect of the retromolar trigone. No extension into the BOT.  Staging PET pending. [de-identified] : Patient presents on C5D4  CRT with weekly Cisplatin for SCC of the right tonsil.  + Odynophagia, severe recently. + Dysphagia. Eating soft food only. + Nausea C1, no emesis and no recent nausea. + Erythematous skin. + Thick excessive oral secretions.  + Xerostomia. + Mucositis. + Chronic tinnitus, slightly worse. + R otalgia less severe. No fevers, mild cough, no SOB.  -denies odynophagia; reports xerostomia/ paste-like\par \par \par

## 2021-03-29 ENCOUNTER — NON-APPOINTMENT (OUTPATIENT)
Age: 56
End: 2021-03-29

## 2021-03-29 ENCOUNTER — APPOINTMENT (OUTPATIENT)
Dept: HEMATOLOGY ONCOLOGY | Facility: CLINIC | Age: 56
End: 2021-03-29

## 2021-03-29 ENCOUNTER — RESULT REVIEW (OUTPATIENT)
Age: 56
End: 2021-03-29

## 2021-03-29 VITALS
SYSTOLIC BLOOD PRESSURE: 123 MMHG | BODY MASS INDEX: 23.16 KG/M2 | OXYGEN SATURATION: 100 % | DIASTOLIC BLOOD PRESSURE: 81 MMHG | HEART RATE: 105 BPM | RESPIRATION RATE: 16 BRPM | WEIGHT: 161.4 LBS

## 2021-03-29 LAB
BASOPHILS # BLD AUTO: 0 K/UL — SIGNIFICANT CHANGE UP (ref 0–0.2)
BASOPHILS NFR BLD AUTO: 0.9 % — SIGNIFICANT CHANGE UP (ref 0–2)
EOSINOPHIL # BLD AUTO: 0.1 K/UL — SIGNIFICANT CHANGE UP (ref 0–0.5)
EOSINOPHIL NFR BLD AUTO: 1.4 % — SIGNIFICANT CHANGE UP (ref 0–6)
HCT VFR BLD CALC: 41.7 % — SIGNIFICANT CHANGE UP (ref 39–50)
HGB BLD-MCNC: 14.3 G/DL — SIGNIFICANT CHANGE UP (ref 13–17)
LYMPHOCYTES # BLD AUTO: 0.4 K/UL — LOW (ref 1–3.3)
LYMPHOCYTES # BLD AUTO: 10.5 % — LOW (ref 13–44)
MCHC RBC-ENTMCNC: 29.1 PG — SIGNIFICANT CHANGE UP (ref 27–34)
MCHC RBC-ENTMCNC: 34.3 G/DL — SIGNIFICANT CHANGE UP (ref 32–36)
MCV RBC AUTO: 84.8 FL — SIGNIFICANT CHANGE UP (ref 80–100)
MONOCYTES # BLD AUTO: 0.3 K/UL — SIGNIFICANT CHANGE UP (ref 0–0.9)
MONOCYTES NFR BLD AUTO: 8 % — SIGNIFICANT CHANGE UP (ref 2–14)
NEUTROPHILS # BLD AUTO: 3.1 K/UL — SIGNIFICANT CHANGE UP (ref 1.8–7.4)
NEUTROPHILS NFR BLD AUTO: 79.1 % — HIGH (ref 43–77)
PLATELET # BLD AUTO: 98 K/UL — LOW (ref 150–400)
RBC # BLD: 4.92 M/UL — SIGNIFICANT CHANGE UP (ref 4.2–5.8)
RBC # FLD: 11.8 % — SIGNIFICANT CHANGE UP (ref 10.3–14.5)
WBC # BLD: 3.9 K/UL — SIGNIFICANT CHANGE UP (ref 3.8–10.5)
WBC # FLD AUTO: 3.9 K/UL — SIGNIFICANT CHANGE UP (ref 3.8–10.5)

## 2021-03-29 PROCEDURE — 77427 RADIATION TX MANAGEMENT X5: CPT

## 2021-03-29 PROCEDURE — 77387 GUIDANCE FOR RADJ TX DLVR: CPT | Mod: 26

## 2021-03-29 NOTE — DISEASE MANAGEMENT
[Clinical] : TNM Stage: c [FreeTextEntry4] : HPV related squamous cell carcinoma [TTNM] : 2 [NTNM] : 2 [MTNM] : 0 [II] : II [de-identified] : 9461 [de-identified] : 7000cGy [de-identified] : tonsil / necks

## 2021-03-30 ENCOUNTER — LABORATORY RESULT (OUTPATIENT)
Age: 56
End: 2021-03-30

## 2021-03-30 ENCOUNTER — APPOINTMENT (OUTPATIENT)
Age: 56
End: 2021-03-30

## 2021-03-30 PROCEDURE — 77387B: CUSTOM | Mod: 26

## 2021-03-31 DIAGNOSIS — Z51.11 ENCOUNTER FOR ANTINEOPLASTIC CHEMOTHERAPY: ICD-10-CM

## 2021-03-31 DIAGNOSIS — C76.0 MALIGNANT NEOPLASM OF HEAD, FACE AND NECK: ICD-10-CM

## 2021-03-31 DIAGNOSIS — R11.2 NAUSEA WITH VOMITING, UNSPECIFIED: ICD-10-CM

## 2021-03-31 DIAGNOSIS — E86.0 DEHYDRATION: ICD-10-CM

## 2021-03-31 PROCEDURE — 77387B: CUSTOM | Mod: 26

## 2021-04-01 PROCEDURE — 77387B: CUSTOM | Mod: 26

## 2021-04-02 PROCEDURE — 77387B: CUSTOM | Mod: 26

## 2021-04-05 ENCOUNTER — NON-APPOINTMENT (OUTPATIENT)
Age: 56
End: 2021-04-05

## 2021-04-05 VITALS
WEIGHT: 160 LBS | BODY MASS INDEX: 22.96 KG/M2 | SYSTOLIC BLOOD PRESSURE: 116 MMHG | HEART RATE: 110 BPM | OXYGEN SATURATION: 98 % | RESPIRATION RATE: 16 BRPM | DIASTOLIC BLOOD PRESSURE: 80 MMHG

## 2021-04-05 PROCEDURE — 77427 RADIATION TX MANAGEMENT X5: CPT

## 2021-04-05 PROCEDURE — 77387 GUIDANCE FOR RADJ TX DLVR: CPT | Mod: 26

## 2021-04-05 NOTE — VITALS
[Maximal Pain Intensity: 5/10] : 5/10 [Least Pain Intensity: 1/10] : 1/10 [Pain Description/Quality: ___] : Pain description/quality: [unfilled] [Pain Duration: ___] : Pain duration: [unfilled] [Pain Location: ___] : Pain Location: [unfilled] [Pain Interferes with ADLs] : Pain interferes with activities of daily living. [OTC] : OTC [Opioid] : opioid [80: Normal activity with effort; some signs or symptoms of disease.] : 80: Normal activity with effort; some signs or symptoms of disease.  [ECOG Performance Status: 2 - Ambulatory and capable of all self care but unable to carry out any work activities] : Performance Status: 2 - Ambulatory and capable of all self care but unable to carry out any work activities. Up and about more than 50% of waking hours

## 2021-04-05 NOTE — DISEASE MANAGEMENT
[Clinical] : TNM Stage: c [FreeTextEntry4] : HPV related squamous cell carcinoma [TTNM] : 2 [NTNM] : 2 [MTNM] : 0 [II] : II [de-identified] : [id number] [de-identified] : 7000cGy [de-identified] : tonsil / necks

## 2021-04-05 NOTE — REVIEW OF SYSTEMS
[Edema Limbs: Grade 0] : Edema Limbs: Grade 0  [Fatigue: Grade 1 - Fatigue relieved by rest] : Fatigue: Grade 1 - Fatigue relieved by rest [Localized Edema: Grade 0] : Localized Edema: Grade 0  [Neck Edema: Grade 0] : Neck Edema: Grade 0 [Mucositis Oral: Grade 2 - Moderate pain; not interfering with oral intake; modified diet indicated] : Mucositis Oral: Grade 2 - Moderate pain; not interfering with oral intake; modified diet indicated [Xerostomia: Grade 2 - Moderate symptoms; oral intake alterations (e.g., copious water, other lubricants, diet limited to purees and/or soft, moist foods); unstimulated saliva 0.1 to 0.2 ml/min] : Xerostomia: Grade 2 - Moderate symptoms; oral intake alterations (e.g., copious water, other lubricants, diet limited to purees and/or soft, moist foods); unstimulated saliva 0.1 to 0.2 ml/min [Oral Pain: Grade 1 - Mild pain] : Oral Pain: Grade 1 - Mild pain [Dysgeusia: Grade 2 - Altered taste with change in diet (e.g., oral supplements); noxious or unpleasant taste; loss of taste] : Dysgeusia: Grade 2 - Altered taste with change in diet (e.g., oral supplements); noxious or unpleasant taste; loss of taste [Skin Hyperpigmentation: Grade 1 - Hyperpigmentation covering <10% BSA; no psychosocial impact] : Skin Hyperpigmentation: Grade 1 - Hyperpigmentation covering <10% BSA; no psychosocial impact [Dermatitis Radiation: Grade 1 - Faint erythema or dry desquamation] : Dermatitis Radiation: Grade 1 - Faint erythema or dry desquamation

## 2021-04-06 PROCEDURE — 77387B: CUSTOM | Mod: 26

## 2021-04-07 PROCEDURE — 77387B: CUSTOM | Mod: 26

## 2021-04-08 PROCEDURE — 77387B: CUSTOM | Mod: 26

## 2021-04-09 PROCEDURE — 77387B: CUSTOM | Mod: 26

## 2021-04-12 ENCOUNTER — RESULT REVIEW (OUTPATIENT)
Age: 56
End: 2021-04-12

## 2021-04-12 ENCOUNTER — APPOINTMENT (OUTPATIENT)
Dept: HEMATOLOGY ONCOLOGY | Facility: CLINIC | Age: 56
End: 2021-04-12

## 2021-04-12 ENCOUNTER — NON-APPOINTMENT (OUTPATIENT)
Age: 56
End: 2021-04-12

## 2021-04-12 VITALS
RESPIRATION RATE: 16 BRPM | SYSTOLIC BLOOD PRESSURE: 144 MMHG | BODY MASS INDEX: 22.87 KG/M2 | HEART RATE: 115 BPM | WEIGHT: 159.4 LBS | OXYGEN SATURATION: 99 % | DIASTOLIC BLOOD PRESSURE: 75 MMHG

## 2021-04-12 LAB
BASOPHILS # BLD AUTO: 0 K/UL — SIGNIFICANT CHANGE UP (ref 0–0.2)
BASOPHILS NFR BLD AUTO: 1.2 % — SIGNIFICANT CHANGE UP (ref 0–2)
EOSINOPHIL # BLD AUTO: 0 K/UL — SIGNIFICANT CHANGE UP (ref 0–0.5)
EOSINOPHIL NFR BLD AUTO: 0.7 % — SIGNIFICANT CHANGE UP (ref 0–6)
HCT VFR BLD CALC: 38.2 % — LOW (ref 39–50)
HGB BLD-MCNC: 13.4 G/DL — SIGNIFICANT CHANGE UP (ref 13–17)
LYMPHOCYTES # BLD AUTO: 0.2 K/UL — LOW (ref 1–3.3)
LYMPHOCYTES # BLD AUTO: 10.3 % — LOW (ref 13–44)
MCHC RBC-ENTMCNC: 29.9 PG — SIGNIFICANT CHANGE UP (ref 27–34)
MCHC RBC-ENTMCNC: 35 G/DL — SIGNIFICANT CHANGE UP (ref 32–36)
MCV RBC AUTO: 85.2 FL — SIGNIFICANT CHANGE UP (ref 80–100)
MONOCYTES # BLD AUTO: 0.3 K/UL — SIGNIFICANT CHANGE UP (ref 0–0.9)
MONOCYTES NFR BLD AUTO: 13.8 % — SIGNIFICANT CHANGE UP (ref 2–14)
NEUTROPHILS # BLD AUTO: 1.6 K/UL — LOW (ref 1.8–7.4)
NEUTROPHILS NFR BLD AUTO: 74.1 % — SIGNIFICANT CHANGE UP (ref 43–77)
PLATELET # BLD AUTO: 133 K/UL — LOW (ref 150–400)
RBC # BLD: 4.48 M/UL — SIGNIFICANT CHANGE UP (ref 4.2–5.8)
RBC # FLD: 12.8 % — SIGNIFICANT CHANGE UP (ref 10.3–14.5)
WBC # BLD: 2.1 K/UL — LOW (ref 3.8–10.5)
WBC # FLD AUTO: 2.1 K/UL — LOW (ref 3.8–10.5)

## 2021-04-12 PROCEDURE — 77427 RADIATION TX MANAGEMENT X5: CPT

## 2021-04-12 PROCEDURE — 77014: CPT | Mod: 26

## 2021-04-12 NOTE — DISEASE MANAGEMENT
[Clinical] : TNM Stage: c [II] : II [FreeTextEntry4] : HPV related squamous cell carcinoma [TTNM] : 2 [NTNM] : 2 [MTNM] : 0 [de-identified] : 2660 [de-identified] : 7000cGy [de-identified] : tonsil / necks

## 2021-04-13 LAB
ALBUMIN SERPL ELPH-MCNC: 4.5 G/DL
ALP BLD-CCNC: 81 U/L
ALT SERPL-CCNC: 23 U/L
ANION GAP SERPL CALC-SCNC: 17 MMOL/L
AST SERPL-CCNC: 20 U/L
BILIRUB SERPL-MCNC: 0.7 MG/DL
BUN SERPL-MCNC: 19 MG/DL
CALCIUM SERPL-MCNC: 9.9 MG/DL
CHLORIDE SERPL-SCNC: 99 MMOL/L
CO2 SERPL-SCNC: 22 MMOL/L
CREAT SERPL-MCNC: 1.01 MG/DL
GLUCOSE SERPL-MCNC: 98 MG/DL
POTASSIUM SERPL-SCNC: 4.6 MMOL/L
PROT SERPL-MCNC: 7 G/DL
SODIUM SERPL-SCNC: 138 MMOL/L

## 2021-04-19 ENCOUNTER — RX RENEWAL (OUTPATIENT)
Age: 56
End: 2021-04-19

## 2021-05-05 ENCOUNTER — OUTPATIENT (OUTPATIENT)
Dept: OUTPATIENT SERVICES | Facility: HOSPITAL | Age: 56
LOS: 1 days | Discharge: HOME | End: 2021-05-05

## 2021-05-05 DIAGNOSIS — C09.9 MALIGNANT NEOPLASM OF TONSIL, UNSPECIFIED: ICD-10-CM

## 2021-05-06 ENCOUNTER — APPOINTMENT (OUTPATIENT)
Dept: HEMATOLOGY ONCOLOGY | Facility: CLINIC | Age: 56
End: 2021-05-06
Payer: COMMERCIAL

## 2021-05-06 ENCOUNTER — RESULT REVIEW (OUTPATIENT)
Age: 56
End: 2021-05-06

## 2021-05-06 VITALS
HEART RATE: 74 BPM | BODY MASS INDEX: 21.81 KG/M2 | WEIGHT: 152 LBS | DIASTOLIC BLOOD PRESSURE: 70 MMHG | SYSTOLIC BLOOD PRESSURE: 113 MMHG | TEMPERATURE: 98.2 F

## 2021-05-06 LAB
ALBUMIN SERPL ELPH-MCNC: 4.6 G/DL — SIGNIFICANT CHANGE UP (ref 3.3–5)
ALP SERPL-CCNC: 57 U/L — SIGNIFICANT CHANGE UP (ref 40–120)
ALT FLD-CCNC: 15 U/L — SIGNIFICANT CHANGE UP (ref 10–45)
ANION GAP SERPL CALC-SCNC: 14 MMOL/L — SIGNIFICANT CHANGE UP (ref 5–17)
AST SERPL-CCNC: 18 U/L — SIGNIFICANT CHANGE UP (ref 10–40)
BASOPHILS # BLD AUTO: 0.04 K/UL — SIGNIFICANT CHANGE UP (ref 0–0.2)
BASOPHILS NFR BLD AUTO: 1 % — SIGNIFICANT CHANGE UP (ref 0–2)
BILIRUB SERPL-MCNC: 0.6 MG/DL — SIGNIFICANT CHANGE UP (ref 0.2–1.2)
BUN SERPL-MCNC: 13 MG/DL — SIGNIFICANT CHANGE UP (ref 7–23)
CALCIUM SERPL-MCNC: 10 MG/DL — SIGNIFICANT CHANGE UP (ref 8.4–10.5)
CHLORIDE SERPL-SCNC: 110 MMOL/L — HIGH (ref 96–108)
CO2 SERPL-SCNC: 23 MMOL/L — SIGNIFICANT CHANGE UP (ref 22–31)
CREAT SERPL-MCNC: 0.88 MG/DL — SIGNIFICANT CHANGE UP (ref 0.5–1.3)
EOSINOPHIL # BLD AUTO: 0.04 K/UL — SIGNIFICANT CHANGE UP (ref 0–0.5)
EOSINOPHIL NFR BLD AUTO: 1 % — SIGNIFICANT CHANGE UP (ref 0–6)
GLUCOSE SERPL-MCNC: 96 MG/DL — SIGNIFICANT CHANGE UP (ref 70–99)
HCT VFR BLD CALC: 37.2 % — LOW (ref 39–50)
HGB BLD-MCNC: 12.3 G/DL — LOW (ref 13–17)
IMM GRANULOCYTES NFR BLD AUTO: 0.5 % — SIGNIFICANT CHANGE UP (ref 0–1.5)
LYMPHOCYTES # BLD AUTO: 0.7 K/UL — LOW (ref 1–3.3)
LYMPHOCYTES # BLD AUTO: 18.2 % — SIGNIFICANT CHANGE UP (ref 13–44)
MAGNESIUM SERPL-MCNC: 2.3 MG/DL — SIGNIFICANT CHANGE UP (ref 1.6–2.6)
MCHC RBC-ENTMCNC: 30.1 PG — SIGNIFICANT CHANGE UP (ref 27–34)
MCHC RBC-ENTMCNC: 33.1 GM/DL — SIGNIFICANT CHANGE UP (ref 32–36)
MCV RBC AUTO: 91 FL — SIGNIFICANT CHANGE UP (ref 80–100)
MONOCYTES # BLD AUTO: 0.54 K/UL — SIGNIFICANT CHANGE UP (ref 0–0.9)
MONOCYTES NFR BLD AUTO: 14.1 % — HIGH (ref 2–14)
NEUTROPHILS # BLD AUTO: 2.5 K/UL — SIGNIFICANT CHANGE UP (ref 1.8–7.4)
NEUTROPHILS NFR BLD AUTO: 65.2 % — SIGNIFICANT CHANGE UP (ref 43–77)
NRBC # BLD: 0 /100 WBCS — SIGNIFICANT CHANGE UP (ref 0–0)
PLATELET # BLD AUTO: 188 K/UL — SIGNIFICANT CHANGE UP (ref 150–400)
POTASSIUM SERPL-MCNC: 5.5 MMOL/L — HIGH (ref 3.5–5.3)
POTASSIUM SERPL-SCNC: 5.5 MMOL/L — HIGH (ref 3.5–5.3)
PROT SERPL-MCNC: 6.9 G/DL — SIGNIFICANT CHANGE UP (ref 6–8.3)
RBC # BLD: 4.09 M/UL — LOW (ref 4.2–5.8)
RBC # FLD: 16.8 % — HIGH (ref 10.3–14.5)
SODIUM SERPL-SCNC: 147 MMOL/L — HIGH (ref 135–145)
WBC # BLD: 3.84 K/UL — SIGNIFICANT CHANGE UP (ref 3.8–10.5)
WBC # FLD AUTO: 3.84 K/UL — SIGNIFICANT CHANGE UP (ref 3.8–10.5)

## 2021-05-06 PROCEDURE — 99215 OFFICE O/P EST HI 40 MIN: CPT

## 2021-05-06 NOTE — HISTORY OF PRESENT ILLNESS
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  Patient c/o a sore throat and right otalgia since 11/20 with no relief from antibiotics.  He saw ENT, Dr. Yassine Zazueta, and also c/o dysphagia.  Saw ENT following no improvement in Nov after antibiotics and had trial second antibiotics.   CT neck showed masslike enlargement of the right palatine tonsil measuring 2.6 x 2.6 x 2.3 cm with partial effacement of the right parapharyngeal space and protrusion of the right tonsil into the oropharynx.  Increased number of B/L cervical LNs measuring less than 1 cm.  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  LVI and PNI not identified.  He saw Dr. Michael De La Cruz where a physical exam showed a 3 cm R tonsil mass which has a central ulceration, probably from the biopsy. Lesion extends submucosally to the uvula and soft palate and laterally to the medial aspect of the retromolar trigone. No extension into the BOT.  Staging PET pending. [de-identified] : Patient presents s/p 6 weeks of  CRT with weekly Cisplatin ended on 4/12/21 for SCC of the right tonsil.  No odynophagia, severe recently. No dysphagia.  No thick excessive oral secretions.  mild xerostomia. No mucositis. + chronic tinnitus, slightly worse. + R otalgia less severe. No fevers, mild cough, no SOB.  \par \par

## 2021-05-07 DIAGNOSIS — K11.7 DISTURBANCES OF SALIVARY SECRETION: ICD-10-CM

## 2021-05-19 ENCOUNTER — APPOINTMENT (OUTPATIENT)
Dept: RADIATION ONCOLOGY | Facility: CLINIC | Age: 56
End: 2021-05-19
Payer: COMMERCIAL

## 2021-05-19 DIAGNOSIS — Z92.21 PERSONAL HISTORY OF ANTINEOPLASTIC CHEMOTHERAPY: ICD-10-CM

## 2021-05-19 PROCEDURE — 99024 POSTOP FOLLOW-UP VISIT: CPT

## 2021-05-20 PROBLEM — Z92.21 PERSONAL HISTORY OF CHEMOTHERAPY: Status: ACTIVE | Noted: 2021-05-20

## 2021-05-20 NOTE — ASSESSMENT
[FreeTextEntry1] : acute treatment related sequelae are largely resolved. Some residual xerostomia and poor taste sensation.

## 2021-05-20 NOTE — HISTORY OF PRESENT ILLNESS
[Home] : at home, [unfilled] , at the time of the visit. [Medical Office: ([facility])___] : at the medical office located in  [Spouse] : spouse [Verbal consent obtained from patient] : the patient, [unfilled] [FreeTextEntry1] : This 56 year year-old male is conferencing for post-treatment evaluation.  Mr. 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  He also underwent concurrent weekly chemotherapy (cisplatin) under the direction of Dr. Ramon.\par \par At last on-treatment visit:\par Reported dysgeusia, slight odynophagia, xerostomia. Sipping water frequently.  Rinsing with Biotene.\par Denied dysphagia.\par Mild oropharyngeal mucositis noted.\par Reported loss of taste which affected eating ability.\par Also reported most foods become a "paste" which hinders eating ability.  Reported difficulty increasing Ensure/Protien shakes d/t taste and texture is disagreeable. Reported  water intake 3-4 16-oz bottles/day.\par \par Today he only complains of some oral dryness and poor taste sensation, no pain per se. Weight is stable now , even gaining some.\par

## 2021-05-20 NOTE — VITALS
[Maximal Pain Intensity: 1/10] : 1/10 [Least Pain Intensity: 0/10] : 0/10 [Pain Location: ___] : Pain Location: [unfilled] [NoTreatment Scheduled] : no treatment scheduled [80: Normal activity with effort; some signs or symptoms of disease.] : 80: Normal activity with effort; some signs or symptoms of disease.  [ECOG Performance Status: 1 - Restricted in physically strenuous activity but ambulatory and able to carry out work of a light or sedentary nature] : Performance Status: 1 - Restricted in physically strenuous activity but ambulatory and able to carry out work of a light or sedentary nature, e.g., light house work, office work

## 2021-05-20 NOTE — LETTER GREETING
[Dear Doctor] : Dear Doctor, [Follow-Up] : Your patient, [unfilled] was seen in my office today for follow-up [Please see my note below.] : Please see my note below. [FreeTextEntry2] : Abbi Ramon MD

## 2021-05-20 NOTE — DISEASE MANAGEMENT
[Clinical] : TNM Stage: c [II] : II [TTNM] : 2 [NTNM] : 2 [MTNM] : 0 [de-identified] : 7000cGy [de-identified] : tonsil

## 2021-05-20 NOTE — LETTER CLOSING
[Sincerely yours,] : Sincerely yours, [FreeTextEntry3] : Yassine Valenzuela MD\par Physician in Chief\par Department of Radiation Medicine\par [facility] [place]\par [facility] [place]\par \par  of Radiation Medicine\par [facility]\par at  [facility]\par \par Radiation \par [facility]par [facility] [place]\par [address]\par [place], [place] [address]\par \par Tel: [phone]\par Fax: [phone]\par

## 2021-05-28 ENCOUNTER — RESULT REVIEW (OUTPATIENT)
Age: 56
End: 2021-05-28

## 2021-05-28 ENCOUNTER — APPOINTMENT (OUTPATIENT)
Dept: OTOLARYNGOLOGY | Facility: CLINIC | Age: 56
End: 2021-05-28
Payer: COMMERCIAL

## 2021-05-28 ENCOUNTER — OUTPATIENT (OUTPATIENT)
Dept: OUTPATIENT SERVICES | Facility: HOSPITAL | Age: 56
LOS: 1 days | Discharge: HOME | End: 2021-05-28

## 2021-05-28 ENCOUNTER — APPOINTMENT (OUTPATIENT)
Dept: HEMATOLOGY ONCOLOGY | Facility: CLINIC | Age: 56
End: 2021-05-28

## 2021-05-28 VITALS
BODY MASS INDEX: 22.05 KG/M2 | HEART RATE: 69 BPM | DIASTOLIC BLOOD PRESSURE: 81 MMHG | WEIGHT: 154 LBS | SYSTOLIC BLOOD PRESSURE: 119 MMHG | HEIGHT: 70 IN

## 2021-05-28 DIAGNOSIS — C09.9 MALIGNANT NEOPLASM OF TONSIL, UNSPECIFIED: ICD-10-CM

## 2021-05-28 LAB
BASOPHILS # BLD AUTO: 0.1 K/UL — SIGNIFICANT CHANGE UP (ref 0–0.2)
BASOPHILS NFR BLD AUTO: 1.1 % — SIGNIFICANT CHANGE UP (ref 0–2)
EOSINOPHIL # BLD AUTO: 0.2 K/UL — SIGNIFICANT CHANGE UP (ref 0–0.5)
EOSINOPHIL NFR BLD AUTO: 3.7 % — SIGNIFICANT CHANGE UP (ref 0–6)
HCT VFR BLD CALC: 42.9 % — SIGNIFICANT CHANGE UP (ref 39–50)
HGB BLD-MCNC: 13.9 G/DL — SIGNIFICANT CHANGE UP (ref 13–17)
LYMPHOCYTES # BLD AUTO: 0.8 K/UL — LOW (ref 1–3.3)
LYMPHOCYTES # BLD AUTO: 15.8 % — SIGNIFICANT CHANGE UP (ref 13–44)
MCHC RBC-ENTMCNC: 30.4 PG — SIGNIFICANT CHANGE UP (ref 27–34)
MCHC RBC-ENTMCNC: 32.3 G/DL — SIGNIFICANT CHANGE UP (ref 32–36)
MCV RBC AUTO: 94.2 FL — SIGNIFICANT CHANGE UP (ref 80–100)
MONOCYTES # BLD AUTO: 0.5 K/UL — SIGNIFICANT CHANGE UP (ref 0–0.9)
MONOCYTES NFR BLD AUTO: 10.6 % — SIGNIFICANT CHANGE UP (ref 2–14)
NEUTROPHILS # BLD AUTO: 3.3 K/UL — SIGNIFICANT CHANGE UP (ref 1.8–7.4)
NEUTROPHILS NFR BLD AUTO: 68.8 % — SIGNIFICANT CHANGE UP (ref 43–77)
PLATELET # BLD AUTO: 174 K/UL — SIGNIFICANT CHANGE UP (ref 150–400)
RBC # BLD: 4.56 M/UL — SIGNIFICANT CHANGE UP (ref 4.2–5.8)
RBC # FLD: 14.5 % — SIGNIFICANT CHANGE UP (ref 10.3–14.5)
WBC # BLD: 4.8 K/UL — SIGNIFICANT CHANGE UP (ref 3.8–10.5)
WBC # FLD AUTO: 4.8 K/UL — SIGNIFICANT CHANGE UP (ref 3.8–10.5)

## 2021-05-28 PROCEDURE — 99072 ADDL SUPL MATRL&STAF TM PHE: CPT

## 2021-05-28 PROCEDURE — 99214 OFFICE O/P EST MOD 30 MIN: CPT | Mod: 25

## 2021-05-28 PROCEDURE — 31575 DIAGNOSTIC LARYNGOSCOPY: CPT

## 2021-05-28 NOTE — HISTORY OF PRESENT ILLNESS
[de-identified] : 56 yro pt referred by Dr. Zazueta for HPV-related Right tonsil SCC. This was found on biopsy done 12/29/2020. \par CT neck from [facility] 12/18/2020 showed masslike enlargement of the R palatine tonsil measuring approx 2.6 x 2.6 x 2.3cm partial effacement of the R parapharyngeal space and protrusion of R tonsil into the oropharynx. Increased number of subcentimeter lymph nodes. \par Pt was c/o sore throat and Right earache and was on antibiotics with limited relief since November 2019. Finally went to ENT.  Pt with dysphagia and is on soft diet but weight is stable. As per wife, voice is lower since biopsy.  No dyspnea, fever, chills, cough. Weight stable. \par Pt quite smoking 22 years ago, was smoking 1pack/day for 15 years. \par Completed RT 4/12/21.  Eating well. C/O lack of taste and dry mouth, but otherwise pt feels well. \par Complete review of systems which was performed during a previous encounter was reviewed with the patient and there are no changes except as stated in the HPI section.\par

## 2021-05-28 NOTE — PHYSICAL EXAM
[Midline] : trachea located in midline position [Normal] : no rashes [de-identified] : 3 cm R tonsil mass which has a central ulceration, probably from the biopsy. Lesion extends submucosally to the uvula and soft palate and laterally to the medial aspect of the retromolar trigone. No extension into the BOT.

## 2021-05-28 NOTE — CONSULT LETTER
[Dear  ___] : Dear  [unfilled], [Consult Letter:] : I had the pleasure of evaluating your patient, [unfilled]. [Please see my note below.] : Please see my note below. [Consult Closing:] : Thank you very much for allowing me to participate in the care of this patient.  If you have any questions, please do not hesitate to contact me. [Sincerely,] : Sincerely, [FreeTextEntry2] : Dr Yassine Zazueta [FreeTextEntry3] : \par Michael De La Cruz MD, FACS\par \par Otolaryngology-Head and Neck Surgery\par [employer] [facility]\par

## 2021-06-02 LAB
ALBUMIN SERPL ELPH-MCNC: 4.5 G/DL
ALP BLD-CCNC: 71 U/L
ALT SERPL-CCNC: 12 U/L
ANION GAP SERPL CALC-SCNC: 10 MMOL/L
AST SERPL-CCNC: 13 U/L
BILIRUB SERPL-MCNC: 0.7 MG/DL
BUN SERPL-MCNC: 17 MG/DL
CALCIUM SERPL-MCNC: 10.3 MG/DL
CHLORIDE SERPL-SCNC: 106 MMOL/L
CO2 SERPL-SCNC: 27 MMOL/L
CREAT SERPL-MCNC: 0.95 MG/DL
GLUCOSE SERPL-MCNC: 101 MG/DL
MAGNESIUM SERPL-MCNC: 2.2 MG/DL
POTASSIUM SERPL-SCNC: 5.1 MMOL/L
PROT SERPL-MCNC: 7 G/DL
SODIUM SERPL-SCNC: 143 MMOL/L

## 2021-06-15 DIAGNOSIS — Z01.812 ENCOUNTER FOR PREPROCEDURAL LABORATORY EXAMINATION: ICD-10-CM

## 2021-07-09 DIAGNOSIS — Z01.818 ENCOUNTER FOR OTHER PREPROCEDURAL EXAMINATION: ICD-10-CM

## 2021-07-16 ENCOUNTER — APPOINTMENT (OUTPATIENT)
Dept: DISASTER EMERGENCY | Facility: CLINIC | Age: 56
End: 2021-07-16

## 2021-07-17 LAB — SARS-COV-2 N GENE NPH QL NAA+PROBE: NOT DETECTED

## 2021-07-19 ENCOUNTER — APPOINTMENT (OUTPATIENT)
Dept: NUCLEAR MEDICINE | Facility: CLINIC | Age: 56
End: 2021-07-19
Payer: COMMERCIAL

## 2021-07-19 ENCOUNTER — OUTPATIENT (OUTPATIENT)
Dept: OUTPATIENT SERVICES | Facility: HOSPITAL | Age: 56
LOS: 1 days | Discharge: HOME | End: 2021-07-19

## 2021-07-19 ENCOUNTER — OUTPATIENT (OUTPATIENT)
Dept: OUTPATIENT SERVICES | Facility: HOSPITAL | Age: 56
LOS: 1 days | End: 2021-07-19
Payer: COMMERCIAL

## 2021-07-19 DIAGNOSIS — C09.9 MALIGNANT NEOPLASM OF TONSIL, UNSPECIFIED: ICD-10-CM

## 2021-07-19 PROCEDURE — A9552: CPT

## 2021-07-19 PROCEDURE — 78815 PET IMAGE W/CT SKULL-THIGH: CPT | Mod: 26,PS

## 2021-07-19 PROCEDURE — 78815 PET IMAGE W/CT SKULL-THIGH: CPT

## 2021-07-22 ENCOUNTER — APPOINTMENT (OUTPATIENT)
Dept: HEMATOLOGY ONCOLOGY | Facility: CLINIC | Age: 56
End: 2021-07-22

## 2021-07-23 ENCOUNTER — APPOINTMENT (OUTPATIENT)
Dept: HEMATOLOGY ONCOLOGY | Facility: CLINIC | Age: 56
End: 2021-07-23
Payer: COMMERCIAL

## 2021-07-23 ENCOUNTER — RESULT REVIEW (OUTPATIENT)
Age: 56
End: 2021-07-23

## 2021-07-23 VITALS
BODY MASS INDEX: 22.43 KG/M2 | SYSTOLIC BLOOD PRESSURE: 125 MMHG | HEART RATE: 75 BPM | TEMPERATURE: 97.9 F | DIASTOLIC BLOOD PRESSURE: 72 MMHG | WEIGHT: 156.31 LBS | OXYGEN SATURATION: 100 %

## 2021-07-23 LAB
ALBUMIN SERPL ELPH-MCNC: 4.7 G/DL — SIGNIFICANT CHANGE UP (ref 3.3–5)
ALP SERPL-CCNC: 55 U/L — SIGNIFICANT CHANGE UP (ref 40–120)
ALT FLD-CCNC: 13 U/L — SIGNIFICANT CHANGE UP (ref 10–45)
ANION GAP SERPL CALC-SCNC: 11 MMOL/L — SIGNIFICANT CHANGE UP (ref 5–17)
AST SERPL-CCNC: 18 U/L — SIGNIFICANT CHANGE UP (ref 10–40)
BASOPHILS # BLD AUTO: 0.03 K/UL — SIGNIFICANT CHANGE UP (ref 0–0.2)
BASOPHILS NFR BLD AUTO: 0.7 % — SIGNIFICANT CHANGE UP (ref 0–2)
BILIRUB SERPL-MCNC: 0.8 MG/DL — SIGNIFICANT CHANGE UP (ref 0.2–1.2)
BUN SERPL-MCNC: 18 MG/DL — SIGNIFICANT CHANGE UP (ref 7–23)
CALCIUM SERPL-MCNC: 10.3 MG/DL — SIGNIFICANT CHANGE UP (ref 8.4–10.5)
CHLORIDE SERPL-SCNC: 103 MMOL/L — SIGNIFICANT CHANGE UP (ref 96–108)
CO2 SERPL-SCNC: 28 MMOL/L — SIGNIFICANT CHANGE UP (ref 22–31)
CREAT SERPL-MCNC: 1.13 MG/DL — SIGNIFICANT CHANGE UP (ref 0.5–1.3)
EOSINOPHIL # BLD AUTO: 0.14 K/UL — SIGNIFICANT CHANGE UP (ref 0–0.5)
EOSINOPHIL NFR BLD AUTO: 3.1 % — SIGNIFICANT CHANGE UP (ref 0–6)
GLUCOSE SERPL-MCNC: 86 MG/DL — SIGNIFICANT CHANGE UP (ref 70–99)
HCT VFR BLD CALC: 41.9 % — SIGNIFICANT CHANGE UP (ref 39–50)
HGB BLD-MCNC: 13.9 G/DL — SIGNIFICANT CHANGE UP (ref 13–17)
IMM GRANULOCYTES NFR BLD AUTO: 0.2 % — SIGNIFICANT CHANGE UP (ref 0–1.5)
LYMPHOCYTES # BLD AUTO: 0.67 K/UL — LOW (ref 1–3.3)
LYMPHOCYTES # BLD AUTO: 14.9 % — SIGNIFICANT CHANGE UP (ref 13–44)
MAGNESIUM SERPL-MCNC: 2.3 MG/DL — SIGNIFICANT CHANGE UP (ref 1.6–2.6)
MCHC RBC-ENTMCNC: 30.6 PG — SIGNIFICANT CHANGE UP (ref 27–34)
MCHC RBC-ENTMCNC: 33.2 GM/DL — SIGNIFICANT CHANGE UP (ref 32–36)
MCV RBC AUTO: 92.3 FL — SIGNIFICANT CHANGE UP (ref 80–100)
MONOCYTES # BLD AUTO: 0.43 K/UL — SIGNIFICANT CHANGE UP (ref 0–0.9)
MONOCYTES NFR BLD AUTO: 9.5 % — SIGNIFICANT CHANGE UP (ref 2–14)
NEUTROPHILS # BLD AUTO: 3.23 K/UL — SIGNIFICANT CHANGE UP (ref 1.8–7.4)
NEUTROPHILS NFR BLD AUTO: 71.6 % — SIGNIFICANT CHANGE UP (ref 43–77)
NRBC # BLD: 0 /100 WBCS — SIGNIFICANT CHANGE UP (ref 0–0)
PLATELET # BLD AUTO: 173 K/UL — SIGNIFICANT CHANGE UP (ref 150–400)
POTASSIUM SERPL-MCNC: 4.2 MMOL/L — SIGNIFICANT CHANGE UP (ref 3.5–5.3)
POTASSIUM SERPL-SCNC: 4.2 MMOL/L — SIGNIFICANT CHANGE UP (ref 3.5–5.3)
PROT SERPL-MCNC: 7.2 G/DL — SIGNIFICANT CHANGE UP (ref 6–8.3)
RBC # BLD: 4.54 M/UL — SIGNIFICANT CHANGE UP (ref 4.2–5.8)
RBC # FLD: 11.4 % — SIGNIFICANT CHANGE UP (ref 10.3–14.5)
SODIUM SERPL-SCNC: 143 MMOL/L — SIGNIFICANT CHANGE UP (ref 135–145)
T3FREE SERPL-MCNC: 2.98 PG/ML — SIGNIFICANT CHANGE UP (ref 1.8–4.6)
T4 FREE SERPL-MCNC: 1.1 NG/DL — SIGNIFICANT CHANGE UP (ref 0.9–1.8)
TSH SERPL-MCNC: 1.76 UIU/ML — SIGNIFICANT CHANGE UP (ref 0.27–4.2)
WBC # BLD: 4.51 K/UL — SIGNIFICANT CHANGE UP (ref 3.8–10.5)
WBC # FLD AUTO: 4.51 K/UL — SIGNIFICANT CHANGE UP (ref 3.8–10.5)

## 2021-07-23 PROCEDURE — 99215 OFFICE O/P EST HI 40 MIN: CPT

## 2021-07-23 NOTE — RESULTS/DATA
[FreeTextEntry1] : 7/19/2021 PET:\par 1. Interval resolution of FDG avid right tonsillar mass.\par 2. Interval resolution of minimally FDG avid bilateral cervical lymph nodes.\par 3. Nonspecific new mild hypermetabolism in the distal esophagus without CT correlate, physiologic versus inflammatory. Please correlate clinically or with endoscopy as indicated.\par 4. Mild hypermetabolism mapping to a segment of small bowel in the right lower quadrant, also seen on prior study and not significantly changed, probably physiologic. 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  LVI and PNI not identified \par \par 12/18/20 CT Neck:\par masslike enlargement of the right palatine tonsil measuring 2.6 x 2.6 x 2.3 cm with partial effacement of the right parapharyngeal space and protrusion of the right tonsil into the oropharynx.  Increased number of B/L cervical LNs measuring less than 1 cm.

## 2021-07-23 NOTE — HISTORY OF PRESENT ILLNESS
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  Patient c/o a sore throat and right otalgia since 11/20 with no relief from antibiotics.  He saw ENT, Dr. Yassine Zazueta, and also c/o dysphagia.  Saw ENT following no improvement in Nov after antibiotics and had trial second antibiotics.   CT neck showed masslike enlargement of the right palatine tonsil measuring 2.6 x 2.6 x 2.3 cm with partial effacement of the right parapharyngeal space and protrusion of the right tonsil into the oropharynx.  Increased number of B/L cervical LNs measuring less than 1 cm.  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  LVI and PNI not identified.  He saw Dr. Michael De La Cruz where a physical exam showed a 3 cm R tonsil mass which has a central ulceration, probably from the biopsy. Lesion extends submucosally to the uvula and soft palate and laterally to the medial aspect of the retromolar trigone. No extension into the BOT.  Staging PET pending. [de-identified] : Patient presents s/p 6 weeks of  CRT with weekly Cisplatin ended on 4/12/21 for SCC of the right tonsil.  No odynophagia, severe recently. No dysphagia.  No thick excessive oral secretions.  mild xerostomia. No mucositis. + chronic tinnitus, slightly worse. + R otalgia less severe. No fevers, mild cough, no SOB.  \par \par

## 2021-07-27 ENCOUNTER — NON-APPOINTMENT (OUTPATIENT)
Age: 56
End: 2021-07-27

## 2021-07-27 DIAGNOSIS — K25.9 GASTRIC ULCER, UNSPECIFIED AS ACUTE OR CHRONIC, WITHOUT HEMORRHAGE OR PERFORATION: ICD-10-CM

## 2021-07-29 ENCOUNTER — APPOINTMENT (OUTPATIENT)
Dept: PHYSICAL MEDICINE AND REHAB | Facility: CLINIC | Age: 56
End: 2021-07-29
Payer: COMMERCIAL

## 2021-07-29 DIAGNOSIS — K11.7 DISTURBANCES OF SALIVARY SECRETION: ICD-10-CM

## 2021-07-29 PROCEDURE — 99072 ADDL SUPL MATRL&STAF TM PHE: CPT

## 2021-07-29 PROCEDURE — 99204 OFFICE O/P NEW MOD 45 MIN: CPT

## 2021-07-29 RX ORDER — ONDANSETRON 8 MG/1
8 TABLET, ORALLY DISINTEGRATING ORAL EVERY 8 HOURS
Qty: 90 | Refills: 3 | Status: DISCONTINUED | COMMUNITY
Start: 2021-01-27 | End: 2021-07-29

## 2021-07-29 RX ORDER — SUCRALFATE 1 G/10ML
1 SUSPENSION ORAL 4 TIMES DAILY
Qty: 600 | Refills: 0 | Status: DISCONTINUED | COMMUNITY
Start: 2021-03-26 | End: 2021-07-29

## 2021-07-29 RX ORDER — OXYCODONE HYDROCHLORIDE 5 MG/1
5 CAPSULE ORAL
Qty: 40 | Refills: 0 | Status: DISCONTINUED | COMMUNITY
Start: 2021-01-26 | End: 2021-07-29

## 2021-07-29 RX ORDER — PROCHLORPERAZINE MALEATE 10 MG/1
10 TABLET ORAL EVERY 6 HOURS
Qty: 120 | Refills: 3 | Status: DISCONTINUED | COMMUNITY
Start: 2021-01-27 | End: 2021-07-29

## 2021-07-29 RX ORDER — ACETAMINOPHEN 325 MG/1
TABLET, FILM COATED ORAL
Refills: 0 | Status: DISCONTINUED | COMMUNITY
End: 2021-07-29

## 2021-07-29 RX ORDER — CLINDAMYCIN HYDROCHLORIDE 300 MG/1
300 CAPSULE ORAL
Qty: 30 | Refills: 0 | Status: DISCONTINUED | COMMUNITY
Start: 2020-12-17 | End: 2021-07-29

## 2021-07-29 RX ORDER — DIPHENHYDRAMINE HYDROCHLORIDE AND LIDOCAINE HYDROCHLORIDE AND ALUMINUM HYDROXIDE AND MAGNESIUM HYDRO
KIT
Qty: 1 | Refills: 3 | Status: DISCONTINUED | COMMUNITY
Start: 2021-02-23 | End: 2021-07-29

## 2021-07-29 RX ORDER — TRAMADOL HYDROCHLORIDE 50 MG/1
50 TABLET, COATED ORAL 4 TIMES DAILY
Qty: 20 | Refills: 0 | Status: DISCONTINUED | COMMUNITY
Start: 2021-04-12 | End: 2021-07-29

## 2021-07-29 RX ORDER — CINNAMON BARK 500 MG
CAPSULE ORAL
Refills: 0 | Status: DISCONTINUED | COMMUNITY
End: 2021-07-29

## 2021-07-29 NOTE — HISTORY OF PRESENT ILLNESS
[FreeTextEntry1] : Mr. Larson is a 56 year old male with history of Tonsil cancer.  HPV positive SCCa. T2N0Mx.  He Completed CRT with weekly Cisplatin 2/23/21 - 4/12/21. \par \par He reports that 2 weeks ago he began to notice some swelling in his neck region.  He denies any pain or decreased range of motion.  He denies any redness or erythema.  He is also reporting having some dry mouth symptoms.  Reports that this has been persistent since the end of radiation treatment.  He denies any numbness or tingling or weakness in his extremities.\par   \par     \par

## 2021-07-29 NOTE — PHYSICAL EXAM
[FreeTextEntry1] : Gen: Patient is A&O x 3, NAD\par HEENT: EOMI, hearing grossly normal\par Resp: regular, non - labored\par CV: pulses regular\par Skin: no rashes, erythema\par Lymph: +Anterior cervical edema \par Inspection: no instability or misalignment\par ROM: full throughout in cervical region, jaw and shoulders\par Palpation:no tenderness to palpation\par Sensation: intact to light touch\par Reflexes: 1+ and symmetric throughout\par Strength: 5/5 throughout\par Special tests: -Hernandez sign bilateral\par Gait: normal, non-antalgic\par \par Neck measurements:\par Superior: 42 cm\par Middle: 39.5 cm\par Inferior: 39.5 cm

## 2021-07-29 NOTE — ASSESSMENT
[FreeTextEntry1] : 56 year old male with history of tonsillar cancer presenting for evaluation.\par \par #Lymphedema: \par -Lymphedema education provided to patient.\par -Start speech therapy for complete decongestive therapy.\par -Baseline measurements taken today for continued surveillance.\par -Prescription sent for pneumatic compression pump.\par -Oncology notes, ENT notes and radiation oncology notes reviewed.  PET/CT reviewed.\par \par #Xerostomia\par -Continue pilocarpine as needed \par -continue Biotene\par \par Follow up in 4-6 weeks.  \par

## 2021-07-29 NOTE — DATA REVIEWED
[FreeTextEntry1] :  7/19/2021 PET:\par     1. Interval resolution of FDG avid right tonsillar mass.\par     2. Interval resolution of minimally FDG avid bilateral cervical lymph nodes.\par     3. Nonspecific new mild hypermetabolism in the distal esophagus without CT correlate,\par     physiologic versus inflammatory. Please correlate clinically or with endoscopy as\par     indicated.\par     4. Mild hypermetabolism mapping to a segment of small bowel in the right\par     lower quadrant, also seen on prior study and not significantly changed, probably\par     physiologic. Please correlate clinically.

## 2021-08-13 ENCOUNTER — APPOINTMENT (OUTPATIENT)
Dept: OTOLARYNGOLOGY | Facility: CLINIC | Age: 56
End: 2021-08-13
Payer: COMMERCIAL

## 2021-08-13 VITALS
HEART RATE: 71 BPM | WEIGHT: 156 LBS | HEIGHT: 70 IN | DIASTOLIC BLOOD PRESSURE: 80 MMHG | SYSTOLIC BLOOD PRESSURE: 118 MMHG | BODY MASS INDEX: 22.33 KG/M2

## 2021-08-13 PROCEDURE — 99214 OFFICE O/P EST MOD 30 MIN: CPT | Mod: 25

## 2021-08-13 PROCEDURE — 31575 DIAGNOSTIC LARYNGOSCOPY: CPT

## 2021-08-13 RX ORDER — ACETAMINOPHEN 325 MG
TABLET ORAL
Refills: 0 | Status: COMPLETED | COMMUNITY
End: 2021-08-13

## 2021-08-13 RX ORDER — LACTOBACILLUS RHAMNOSUS GG 10B CELL
CAPSULE ORAL
Refills: 0 | Status: COMPLETED | COMMUNITY
End: 2021-08-13

## 2021-08-13 NOTE — PHYSICAL EXAM
[Midline] : trachea located in midline position [Normal] : no rashes [de-identified] : 3 cm R tonsil mass which has a central ulceration, probably from the biopsy. Lesion extends submucosally to the uvula and soft palate and laterally to the medial aspect of the retromolar trigone. No extension into the BOT.

## 2021-08-13 NOTE — HISTORY OF PRESENT ILLNESS
[de-identified] : 56 yro pt referred by Dr. Zazueta for HPV-related Right tonsil SCC. This was found on biopsy done 12/29/2020. \par CT neck from [facility] 12/18/2020 showed masslike enlargement of the R palatine tonsil measuring approx 2.6 x 2.6 x 2.3cm partial effacement of the R parapharyngeal space and protrusion of R tonsil into the oropharynx. Increased number of subcentimeter lymph nodes. \par Pt was c/o sore throat and Right earache and was on antibiotics with limited relief since November 2019. Finally went to ENT.  Pt with dysphagia and is on soft diet but weight is stable. As per wife, voice is lower since biopsy.  No dyspnea, fever, chills, cough. Weight stable. \par Pt quite smoking 22 years ago, was smoking 1pack/day for 15 years. \par Completed RT 4/12/21.  Eating well. C/O lack of taste and dry mouth, but otherwise pt feels well. PET done on July 19, 2021\par Complete review of systems which was performed during a previous encounter was reviewed with the patient and there are no changes except as stated in the HPI section.\par

## 2021-08-13 NOTE — CONSULT LETTER
[Dear  ___] : Dear  [unfilled], [Consult Letter:] : I had the pleasure of evaluating your patient, [unfilled]. [Please see my note below.] : Please see my note below. [Consult Closing:] : Thank you very much for allowing me to participate in the care of this patient.  If you have any questions, please do not hesitate to contact me. [Sincerely,] : Sincerely, [FreeTextEntry2] : Dr Yassine Zazueta [FreeTextEntry3] : \par Michael De La Cruz MD, FACS\par \par Otolaryngology-Head and Neck Surgery\par [employer] [facility] [employer] [facility]\par

## 2021-09-14 ENCOUNTER — APPOINTMENT (OUTPATIENT)
Dept: PHYSICAL MEDICINE AND REHAB | Facility: CLINIC | Age: 56
End: 2021-09-14
Payer: COMMERCIAL

## 2021-09-14 PROCEDURE — 99214 OFFICE O/P EST MOD 30 MIN: CPT | Mod: 25

## 2021-09-14 PROCEDURE — 98925 OSTEOPATH MANJ 1-2 REGIONS: CPT

## 2021-09-14 NOTE — HISTORY OF PRESENT ILLNESS
[FreeTextEntry1] : Mr. Larson is a 56 year old male with history of Tonsil cancer.  HPV positive SCCa. T2N0Mx.  He Completed CRT with weekly Cisplatin 2/23/21 - 4/12/21. \par \par He has been working with lymphedema therapy.  Reports that swelling has improved.  He is waiting on obtaining pneumatic compression pump as lymphedema is currently stable.\par \par He does report he is having worsening right jaw pain.  He states that this has been present for about 3 weeks.  He denies any overt trauma.  He describes that chewing makes the pain worse.  He also describes opening his jaw wide makes the pain worse.  He denies any overt trauma.  He has been taking Tylenol which he reports has been helping.\par     \par

## 2021-09-14 NOTE — PHYSICAL EXAM
[FreeTextEntry1] : Gen: Patient is A&O x 3, NAD\par HEENT: EOMI, hearing grossly normal\par Resp: regular, non - labored\par CV: pulses regular\par Skin: no rashes, erythema\par Lymph: +Anterior cervical edema \par Inspection: no instability or misalignment\par ROM: full throughout in cervical region, jaw and shoulders\par Palpation:  TTP right TMJ\par Sensation: intact to light touch\par Reflexes: 1+ and symmetric throughout\par Strength: 5/5 throughout\par Special tests: -Hernandez sign bilateral\par Gait: normal, non-antalgic\par

## 2021-09-14 NOTE — REVIEW OF SYSTEMS
[Negative] : Psychiatric [FreeTextEntry4] : +Dry mouth  [FreeTextEntry9] : +Right jaw pain  [de-identified] : +Cervical edema

## 2021-09-14 NOTE — ASSESSMENT
[FreeTextEntry1] : 56 year old male with history of tonsillar cancer presenting for evaluation.\par \par #Lymphedema:\par -Improving \par -Continue complete decongestive therapy.\par -Case discussed with Sherley-JOSE ARMANDO-will await obtaining pneumatic compression pump given improvement.\par \par #Xerostomia\par -Continue pilocarpine as needed \par \par #Jaw pain:\par -Appears secondary to TMJ\par -PET/CT in July 2021 does not demonstrate any abnormal activity in bones\par -Continue Tylenol as needed for pain, avoid NSAIDs given history of gastric ulcers\par -Discussed risks and benefits of OMT\par -Osteopathic structural exam demonstrated somatic dysfunction and the patient agreed to osteopathic manipulation.\par  -If no improvement with conservative measures, will consult Dr. Valenzuela to rule out ORN \par \par 1. Somatic dysfunction Cranial:\par -OMT performed with myofascial release and cranio-sacral therapy \par \par Follow up in 1 week.  \par

## 2021-09-23 ENCOUNTER — APPOINTMENT (OUTPATIENT)
Dept: PHYSICAL MEDICINE AND REHAB | Facility: CLINIC | Age: 56
End: 2021-09-23
Payer: COMMERCIAL

## 2021-09-23 VITALS
HEIGHT: 70 IN | DIASTOLIC BLOOD PRESSURE: 81 MMHG | BODY MASS INDEX: 22.33 KG/M2 | WEIGHT: 156 LBS | HEART RATE: 70 BPM | SYSTOLIC BLOOD PRESSURE: 122 MMHG | TEMPERATURE: 97.1 F

## 2021-09-23 PROCEDURE — 98925 OSTEOPATH MANJ 1-2 REGIONS: CPT

## 2021-09-23 PROCEDURE — 99213 OFFICE O/P EST LOW 20 MIN: CPT | Mod: 25

## 2021-09-23 NOTE — ASSESSMENT
[FreeTextEntry1] : 56 year old male with history of tonsillar cancer presenting for evaluation.\par \par #Jaw pain:\par -Appears secondary to TMJ, symptoms are improving.\par -PET/CT in July 2021 does not demonstrate any abnormal activity in bones.  Discussed with patient will continue with conservative measures given improvement in symptoms.  If pain worsens are begins while at rest, will discuss with oncology/surgical team regarding further imaging.  \par -Continue Tylenol as needed for pain, avoid NSAIDs given history of gastric ulcers\par -Discussed risks and benefits of OMT\par -Osteopathic structural exam demonstrated somatic dysfunction and the patient agreed to osteopathic manipulation.\par \par 1. Somatic dysfunction Cranial:\par -OMT performed with myofascial release and cranio-sacral therapy \par \par Follow up in 1 week.  \par

## 2021-09-23 NOTE — HISTORY OF PRESENT ILLNESS
[FreeTextEntry1] : Mr. Larson is a 56 year old male with history of Tonsil cancer.  HPV positive SCCa. T2N0Mx.  He Completed CRT with weekly Cisplatin 2/23/21 - 4/12/21. \par \par He reports his jaw pain is improving.  He does not have pain while at rest.  He reports that he still does have mild pain with opening his jaw and yawning.  \par

## 2021-10-14 ENCOUNTER — APPOINTMENT (OUTPATIENT)
Dept: PHYSICAL MEDICINE AND REHAB | Facility: CLINIC | Age: 56
End: 2021-10-14
Payer: COMMERCIAL

## 2021-10-14 VITALS
HEART RATE: 74 BPM | BODY MASS INDEX: 22.33 KG/M2 | HEIGHT: 70 IN | DIASTOLIC BLOOD PRESSURE: 80 MMHG | WEIGHT: 156 LBS | SYSTOLIC BLOOD PRESSURE: 132 MMHG | TEMPERATURE: 97.4 F

## 2021-10-14 DIAGNOSIS — R41.89 OTHER SYMPTOMS AND SIGNS INVOLVING COGNITIVE FUNCTIONS AND AWARENESS: ICD-10-CM

## 2021-10-14 DIAGNOSIS — M79.2 NEURALGIA AND NEURITIS, UNSPECIFIED: ICD-10-CM

## 2021-10-14 DIAGNOSIS — M99.00 SEGMENTAL AND SOMATIC DYSFUNCTION OF HEAD REGION: ICD-10-CM

## 2021-10-14 DIAGNOSIS — R68.84 JAW PAIN: ICD-10-CM

## 2021-10-14 DIAGNOSIS — R25.2 CRAMP AND SPASM: ICD-10-CM

## 2021-10-14 PROCEDURE — 99214 OFFICE O/P EST MOD 30 MIN: CPT | Mod: 25

## 2021-10-14 PROCEDURE — 98925 OSTEOPATH MANJ 1-2 REGIONS: CPT

## 2021-10-14 NOTE — ASSESSMENT
[FreeTextEntry1] : 56 year old male with history of tonsillar cancer presenting for evaluation.\par \par #Jaw pain:\par -Symptoms are improving.\par -He will follow up with ENT for further evaluation\par -Osteopathic structural exam demonstrated somatic dysfunction and the patient agreed to osteopathic manipulation.\par  \par #Neuropathic pain:\par -Start gabapentin 300mg TID\par \par #Lymphedema:\par -Stable, continue complete decongestive therapy, case discussed with Sherley-SLP\par \par 1. Somatic dysfunction Cranial:\par -OMT performed with myofascial release, temporal balancing and cranio-sacral therapy \par \par Follow up in 1 month  \par

## 2021-10-14 NOTE — PHYSICAL EXAM
[FreeTextEntry1] : Gen: Patient is A&O x 3, NAD\par HEENT: EOMI, hearing grossly normal\par Resp: regular, non - labored\par CV: pulses regular\par Skin: no rashes, erythema\par Lymph: +Anterior cervical edema, no cervical lymphadenopathy to palpation  \par Inspection: no instability or misalignment\par ROM: full throughout in cervical region, jaw and shoulders\par Palpation:  No TTP right TMJ of SCM\par Sensation: intact to light touch\par Reflexes: 1+ and symmetric throughout\par Strength: 5/5 throughout\par Special tests: -Hernandez sign bilateral\par Gait: normal, non-antalgic\par \par Osteopathic Structural Exam:\par Cranial: OA rotated right, Left torsion, Right TMJ myofascial restriction \par \par \par \par

## 2021-10-14 NOTE — HISTORY OF PRESENT ILLNESS
[FreeTextEntry1] : Mr. Larson is a 56 year old male with history of Tonsil cancer.  HPV positive SCCa. T2N0Mx.  He Completed CRT with weekly Cisplatin 2/23/21 - 4/12/21. \par \par He reports that his jaw pain is improving.  He recently noticed he is having some radiating and shooting pain up his right cervical region.  He denies any radiation down to his arms or any weakness or bowel bladder dysfunction.  He states that this pain comes and goes and is not constant.  He reports his lymphedema is improving.\par

## 2021-10-14 NOTE — REVIEW OF SYSTEMS
[Negative] : Psychiatric [FreeTextEntry9] : +Right jaw pain  [de-identified] : +Neck burning pain [de-identified] : +Cervical edema

## 2021-10-18 PROBLEM — R41.89 IMPAIRED COGNITION: Status: ACTIVE | Noted: 2021-10-18

## 2021-10-18 PROBLEM — R25.2 TRISMUS: Status: ACTIVE | Noted: 2021-10-18

## 2021-11-12 ENCOUNTER — APPOINTMENT (OUTPATIENT)
Dept: OTOLARYNGOLOGY | Facility: CLINIC | Age: 56
End: 2021-11-12
Payer: COMMERCIAL

## 2021-11-12 VITALS — DIASTOLIC BLOOD PRESSURE: 81 MMHG | OXYGEN SATURATION: 97 % | HEART RATE: 76 BPM | SYSTOLIC BLOOD PRESSURE: 124 MMHG

## 2021-11-12 PROCEDURE — 31575 DIAGNOSTIC LARYNGOSCOPY: CPT

## 2021-11-12 PROCEDURE — 99214 OFFICE O/P EST MOD 30 MIN: CPT | Mod: 25

## 2021-11-12 NOTE — REASON FOR VISIT
[Subsequent Evaluation] : a subsequent evaluation for [Spouse] : spouse [FreeTextEntry2] : Right tonsil SCC

## 2021-11-12 NOTE — HISTORY OF PRESENT ILLNESS
16-Jun-2017 05:42
[de-identified] : 56 yro pt referred by Dr. Zazueta for HPV-related Right tonsil SCC. This was found on biopsy done 12/29/2020. \par Pt quite smoking 22 years ago, was smoking 1pack/day for 15 years. \par Completed RT 4/12/21.  PET done on July 19, 2021.  Pt followed by Dr. Muñoz for jaw and ear pain.  \par Complete review of systems which was performed during a previous encounter was reviewed with the patient and there are no changes except as stated in the HPI section.\par

## 2021-11-12 NOTE — CONSULT LETTER
[Dear  ___] : Dear  [unfilled], [Consult Letter:] : I had the pleasure of evaluating your patient, [unfilled]. [Please see my note below.] : Please see my note below. [Consult Closing:] : Thank you very much for allowing me to participate in the care of this patient.  If you have any questions, please do not hesitate to contact me. [Sincerely,] : Sincerely, [FreeTextEntry2] : Dr Yassine Zazueta [FreeTextEntry3] : \par Michael De La Cruz MD, FACS\par \par Otolaryngology-Head and Neck Surgery\par [employer] [facility] [employer] [facility]\par

## 2021-11-20 ENCOUNTER — APPOINTMENT (OUTPATIENT)
Dept: CT IMAGING | Facility: CLINIC | Age: 56
End: 2021-11-20
Payer: COMMERCIAL

## 2021-11-20 ENCOUNTER — OUTPATIENT (OUTPATIENT)
Dept: OUTPATIENT SERVICES | Facility: HOSPITAL | Age: 56
LOS: 1 days | End: 2021-11-20

## 2021-11-20 DIAGNOSIS — C09.9 MALIGNANT NEOPLASM OF TONSIL, UNSPECIFIED: ICD-10-CM

## 2021-11-20 PROCEDURE — 70491 CT SOFT TISSUE NECK W/DYE: CPT | Mod: 26

## 2021-12-02 ENCOUNTER — NON-APPOINTMENT (OUTPATIENT)
Age: 56
End: 2021-12-02

## 2022-01-24 ENCOUNTER — APPOINTMENT (OUTPATIENT)
Dept: CT IMAGING | Facility: CLINIC | Age: 57
End: 2022-01-24

## 2022-02-03 ENCOUNTER — OUTPATIENT (OUTPATIENT)
Dept: OUTPATIENT SERVICES | Facility: HOSPITAL | Age: 57
LOS: 1 days | Discharge: HOME | End: 2022-02-03

## 2022-02-03 DIAGNOSIS — C09.9 MALIGNANT NEOPLASM OF TONSIL, UNSPECIFIED: ICD-10-CM

## 2022-02-04 ENCOUNTER — APPOINTMENT (OUTPATIENT)
Dept: HEMATOLOGY ONCOLOGY | Facility: CLINIC | Age: 57
End: 2022-02-04
Payer: COMMERCIAL

## 2022-02-04 ENCOUNTER — RESULT REVIEW (OUTPATIENT)
Age: 57
End: 2022-02-04

## 2022-02-04 VITALS
HEIGHT: 70 IN | OXYGEN SATURATION: 98 % | DIASTOLIC BLOOD PRESSURE: 77 MMHG | SYSTOLIC BLOOD PRESSURE: 124 MMHG | WEIGHT: 167 LBS | TEMPERATURE: 98 F | HEART RATE: 71 BPM | BODY MASS INDEX: 23.91 KG/M2 | RESPIRATION RATE: 17 BRPM

## 2022-02-04 DIAGNOSIS — K25.9 GASTRIC ULCER, UNSPECIFIED AS ACUTE OR CHRONIC, WITHOUT HEMORRHAGE OR PERFORATION: ICD-10-CM

## 2022-02-04 LAB
ALBUMIN SERPL ELPH-MCNC: 4.9 G/DL — SIGNIFICANT CHANGE UP (ref 3.3–5)
ALP SERPL-CCNC: 62 U/L — SIGNIFICANT CHANGE UP (ref 40–120)
ALT FLD-CCNC: 18 U/L — SIGNIFICANT CHANGE UP (ref 10–45)
ANION GAP SERPL CALC-SCNC: 13 MMOL/L — SIGNIFICANT CHANGE UP (ref 5–17)
AST SERPL-CCNC: 22 U/L — SIGNIFICANT CHANGE UP (ref 10–40)
BASOPHILS # BLD AUTO: 0.03 K/UL — SIGNIFICANT CHANGE UP (ref 0–0.2)
BASOPHILS NFR BLD AUTO: 0.6 % — SIGNIFICANT CHANGE UP (ref 0–2)
BILIRUB SERPL-MCNC: 0.8 MG/DL — SIGNIFICANT CHANGE UP (ref 0.2–1.2)
BUN SERPL-MCNC: 18 MG/DL — SIGNIFICANT CHANGE UP (ref 7–23)
CALCIUM SERPL-MCNC: 10.5 MG/DL — SIGNIFICANT CHANGE UP (ref 8.4–10.5)
CHLORIDE SERPL-SCNC: 107 MMOL/L — SIGNIFICANT CHANGE UP (ref 96–108)
CO2 SERPL-SCNC: 25 MMOL/L — SIGNIFICANT CHANGE UP (ref 22–31)
CREAT SERPL-MCNC: 1.02 MG/DL — SIGNIFICANT CHANGE UP (ref 0.5–1.3)
EOSINOPHIL # BLD AUTO: 0.21 K/UL — SIGNIFICANT CHANGE UP (ref 0–0.5)
EOSINOPHIL NFR BLD AUTO: 4.5 % — SIGNIFICANT CHANGE UP (ref 0–6)
GLUCOSE SERPL-MCNC: 91 MG/DL — SIGNIFICANT CHANGE UP (ref 70–99)
HCT VFR BLD CALC: 45 % — SIGNIFICANT CHANGE UP (ref 39–50)
HGB BLD-MCNC: 15 G/DL — SIGNIFICANT CHANGE UP (ref 13–17)
IMM GRANULOCYTES NFR BLD AUTO: 0.4 % — SIGNIFICANT CHANGE UP (ref 0–1.5)
LYMPHOCYTES # BLD AUTO: 0.91 K/UL — LOW (ref 1–3.3)
LYMPHOCYTES # BLD AUTO: 19.4 % — SIGNIFICANT CHANGE UP (ref 13–44)
MAGNESIUM SERPL-MCNC: 2.3 MG/DL — SIGNIFICANT CHANGE UP (ref 1.6–2.6)
MCHC RBC-ENTMCNC: 28.7 PG — SIGNIFICANT CHANGE UP (ref 27–34)
MCHC RBC-ENTMCNC: 33.3 GM/DL — SIGNIFICANT CHANGE UP (ref 32–36)
MCV RBC AUTO: 86 FL — SIGNIFICANT CHANGE UP (ref 80–100)
MONOCYTES # BLD AUTO: 0.49 K/UL — SIGNIFICANT CHANGE UP (ref 0–0.9)
MONOCYTES NFR BLD AUTO: 10.4 % — SIGNIFICANT CHANGE UP (ref 2–14)
NEUTROPHILS # BLD AUTO: 3.04 K/UL — SIGNIFICANT CHANGE UP (ref 1.8–7.4)
NEUTROPHILS NFR BLD AUTO: 64.7 % — SIGNIFICANT CHANGE UP (ref 43–77)
NRBC # BLD: 0 /100 WBCS — SIGNIFICANT CHANGE UP (ref 0–0)
PLATELET # BLD AUTO: 158 K/UL — SIGNIFICANT CHANGE UP (ref 150–400)
POTASSIUM SERPL-MCNC: 5.4 MMOL/L — HIGH (ref 3.5–5.3)
POTASSIUM SERPL-SCNC: 5.4 MMOL/L — HIGH (ref 3.5–5.3)
PROT SERPL-MCNC: 7.5 G/DL — SIGNIFICANT CHANGE UP (ref 6–8.3)
RBC # BLD: 5.23 M/UL — SIGNIFICANT CHANGE UP (ref 4.2–5.8)
RBC # FLD: 13.1 % — SIGNIFICANT CHANGE UP (ref 10.3–14.5)
SODIUM SERPL-SCNC: 145 MMOL/L — SIGNIFICANT CHANGE UP (ref 135–145)
T3FREE SERPL-MCNC: 3.23 PG/ML — SIGNIFICANT CHANGE UP (ref 1.8–4.6)
T4 FREE SERPL-MCNC: 1 NG/DL — SIGNIFICANT CHANGE UP (ref 0.9–1.8)
TSH SERPL-MCNC: 2.15 UIU/ML — SIGNIFICANT CHANGE UP (ref 0.27–4.2)
WBC # BLD: 4.7 K/UL — SIGNIFICANT CHANGE UP (ref 3.8–10.5)
WBC # FLD AUTO: 4.7 K/UL — SIGNIFICANT CHANGE UP (ref 3.8–10.5)

## 2022-02-04 PROCEDURE — 99215 OFFICE O/P EST HI 40 MIN: CPT

## 2022-02-04 NOTE — HISTORY OF PRESENT ILLNESS
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  Patient c/o a sore throat and right otalgia since 11/20 with no relief from antibiotics.  He saw ENT, Dr. Yassine Zazueta, and also c/o dysphagia.  Saw ENT following no improvement in Nov after antibiotics and had trial second antibiotics.   CT neck showed masslike enlargement of the right palatine tonsil measuring 2.6 x 2.6 x 2.3 cm with partial effacement of the right parapharyngeal space and protrusion of the right tonsil into the oropharynx.  Increased number of B/L cervical LNs measuring less than 1 cm.  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  LVI and PNI not identified.  He saw Dr. Michael De La Cruz where a physical exam showed a 3 cm R tonsil mass which has a central ulceration, probably from the biopsy. Lesion extends submucosally to the uvula and soft palate and laterally to the medial aspect of the retromolar trigone. No extension into the BOT.  Staging PET pending. [de-identified] : Patient presents s/p 6 weeks of  CRT with weekly Cisplatin ended on 4/12/21 for SCC of the right tonsil.  No odynophagia, severe recently. No dysphagia.  No thick excessive oral secretions.  mild xerostomia. No mucositis. + chronic tinnitus, slightly worse. + R otalgia less severe. No fevers, mild cough, no SOB.  \par \par

## 2022-02-04 NOTE — RESULTS/DATA
[FreeTextEntry1] : 11/20/21 CT Neck: No evidence of recurrent or metastatic disease.\par \par 7/19/2021 PET:\par 1. Interval resolution of FDG avid right tonsillar mass.\par 2. Interval resolution of minimally FDG avid bilateral cervical lymph nodes.\par 3. Nonspecific new mild hypermetabolism in the distal esophagus without CT correlate, physiologic versus inflammatory. Please correlate clinically or with endoscopy as indicated.\par 4. Mild hypermetabolism mapping to a segment of small bowel in the right lower quadrant, also seen on prior study and not significantly changed, probably physiologic. 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  LVI and PNI not identified \par \par 12/18/20 CT Neck:\par masslike enlargement of the right palatine tonsil measuring 2.6 x 2.6 x 2.3 cm with partial effacement of the right parapharyngeal space and protrusion of the right tonsil into the oropharynx.  Increased number of B/L cervical LNs measuring less than 1 cm.

## 2022-02-11 ENCOUNTER — APPOINTMENT (OUTPATIENT)
Dept: OTOLARYNGOLOGY | Facility: CLINIC | Age: 57
End: 2022-02-11
Payer: COMMERCIAL

## 2022-02-11 VITALS
SYSTOLIC BLOOD PRESSURE: 121 MMHG | DIASTOLIC BLOOD PRESSURE: 78 MMHG | HEIGHT: 70 IN | BODY MASS INDEX: 23.91 KG/M2 | HEART RATE: 68 BPM | WEIGHT: 167 LBS

## 2022-02-11 PROCEDURE — 31575 DIAGNOSTIC LARYNGOSCOPY: CPT

## 2022-02-11 PROCEDURE — 99214 OFFICE O/P EST MOD 30 MIN: CPT | Mod: 25

## 2022-02-11 NOTE — HISTORY OF PRESENT ILLNESS
[de-identified] : 56 yro pt referred by Dr. Zazueta for HPV-related Right tonsil SCC. This was found on biopsy done 12/29/2020. \par Pt quite smoking 22 years ago, was smoking 1pack/day for 15 years. \par Completed CRT 4/12/21.  PET done on July 19, 2021. CT neck done on 11/20/21.  Pt followed by Dr. Muñoz for lymphedema and occasional dysphagia and R neck soreness.  \par Complete review of systems which was performed during a previous encounter was reviewed with the patient and there are no changes except as stated in the HPI section.\par

## 2022-02-11 NOTE — CONSULT LETTER
[Dear  ___] : Dear  [unfilled], [Consult Letter:] : I had the pleasure of evaluating your patient, [unfilled]. [Please see my note below.] : Please see my note below. [Consult Closing:] : Thank you very much for allowing me to participate in the care of this patient.  If you have any questions, please do not hesitate to contact me. [Sincerely,] : Sincerely, [FreeTextEntry2] : Dr Yassine Zazueta [FreeTextEntry3] : \par Michael De La Cruz MD, FACS\par \par Otolaryngology-Head and Neck Surgery\par [employer] [facility]\par

## 2022-02-19 ENCOUNTER — APPOINTMENT (OUTPATIENT)
Dept: CT IMAGING | Facility: CLINIC | Age: 57
End: 2022-02-19

## 2022-05-13 ENCOUNTER — APPOINTMENT (OUTPATIENT)
Dept: OTOLARYNGOLOGY | Facility: CLINIC | Age: 57
End: 2022-05-13
Payer: COMMERCIAL

## 2022-05-13 VITALS
WEIGHT: 167 LBS | BODY MASS INDEX: 23.91 KG/M2 | HEART RATE: 63 BPM | HEIGHT: 70 IN | DIASTOLIC BLOOD PRESSURE: 78 MMHG | SYSTOLIC BLOOD PRESSURE: 122 MMHG

## 2022-05-13 PROCEDURE — 99214 OFFICE O/P EST MOD 30 MIN: CPT | Mod: 25

## 2022-05-13 PROCEDURE — 31575 DIAGNOSTIC LARYNGOSCOPY: CPT

## 2022-05-13 NOTE — HISTORY OF PRESENT ILLNESS
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 This was found on biopsy done 12/29/2020. \par Pt quite smoking 22 years ago, was smoking 1pack/day for 15 years. \par Completed CRT 4/12/21.  PET done on July 19, 2021. CT neck done on 11/20/21.  Pt followed by Dr. Muñoz for lymphedema and occasional dysphagia and R neck soreness.  Next CT and chest neck on June 7, 2022. \par Last CT scan from November 2021 without signs of recurrence.\par Complete review of systems which was performed during a previous encounter was reviewed with the patient and there are no changes except as stated in the HPI section.\par

## 2022-05-13 NOTE — CONSULT LETTER
[Dear  ___] : Dear  [unfilled], [Consult Letter:] : I had the pleasure of evaluating your patient, [unfilled]. [Please see my note below.] : Please see my note below. [Consult Closing:] : Thank you very much for allowing me to participate in the care of this patient.  If you have any questions, please do not hesitate to contact me. [Sincerely,] : Sincerely, [FreeTextEntry2] : Dr Yassine Zazueta [FreeTextEntry3] : \par Michael De La Cruz MD, FACS\par \par Otolaryngology-Head and Neck Surgery\par [employer] [facility]\par

## 2022-06-07 ENCOUNTER — OUTPATIENT (OUTPATIENT)
Dept: OUTPATIENT SERVICES | Facility: HOSPITAL | Age: 57
LOS: 1 days | End: 2022-06-07

## 2022-06-07 ENCOUNTER — APPOINTMENT (OUTPATIENT)
Dept: CT IMAGING | Facility: CLINIC | Age: 57
End: 2022-06-07
Payer: COMMERCIAL

## 2022-06-07 DIAGNOSIS — C09.9 MALIGNANT NEOPLASM OF TONSIL, UNSPECIFIED: ICD-10-CM

## 2022-06-07 PROCEDURE — 71260 CT THORAX DX C+: CPT | Mod: 26

## 2022-06-07 PROCEDURE — 70491 CT SOFT TISSUE NECK W/DYE: CPT | Mod: 26

## 2022-06-15 ENCOUNTER — OUTPATIENT (OUTPATIENT)
Dept: OUTPATIENT SERVICES | Facility: HOSPITAL | Age: 57
LOS: 1 days | Discharge: HOME | End: 2022-06-15

## 2022-06-15 DIAGNOSIS — C09.9 MALIGNANT NEOPLASM OF TONSIL, UNSPECIFIED: ICD-10-CM

## 2022-06-16 ENCOUNTER — APPOINTMENT (OUTPATIENT)
Dept: HEMATOLOGY ONCOLOGY | Facility: CLINIC | Age: 57
End: 2022-06-16
Payer: COMMERCIAL

## 2022-06-16 ENCOUNTER — APPOINTMENT (OUTPATIENT)
Dept: HEMATOLOGY ONCOLOGY | Facility: CLINIC | Age: 57
End: 2022-06-16

## 2022-06-16 PROCEDURE — 99443: CPT

## 2022-06-16 NOTE — RESULTS/DATA
[FreeTextEntry1] : 6/7/22 CT Neck: Stable exam. No evidence of recurrent or metastatic disease.\par 6/7/22 CT Chest: Several new bilateral lower lobe pulmonary nodular opacities measuring up to 0.5 cm; indeterminate, but may be infectious/inflammatory in etiology. Recommend CT chest follow-up in 3 months to assess for stability/clearing.\par \par 11/20/21 CT Neck: No evidence of recurrent or metastatic disease.\par \par 7/19/2021 PET:\par 1. Interval resolution of FDG avid right tonsillar mass.\par 2. Interval resolution of minimally FDG avid bilateral cervical lymph nodes.\par 3. Nonspecific new mild hypermetabolism in the distal esophagus without CT correlate, physiologic versus inflammatory. Please correlate clinically or with endoscopy as indicated.\par 4. Mild hypermetabolism mapping to a segment of small bowel in the right lower quadrant, also seen on prior study and not significantly changed, probably physiologic. 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  LVI and PNI not identified \par \par 12/18/20 CT Neck:\par masslike enlargement of the right palatine tonsil measuring 2.6 x 2.6 x 2.3 cm with partial effacement of the right parapharyngeal space and protrusion of the right tonsil into the oropharynx.  Increased number of B/L cervical LNs measuring less than 1 cm.

## 2022-06-16 NOTE — HISTORY OF PRESENT ILLNESS
[Home] : at home, [unfilled] , at the time of the visit. [Medical Office: ([facility])___] : at the medical office located in  [Verbal consent obtained from patient] : the patient, [unfilled] [de-identified] : The patient was diagnosed with SCC of the right tonsil in December 2020 at the age of 55.  Patient c/o a sore throat and right otalgia since 11/20 with no relief from antibiotics.  He saw ENT, Dr. Yassine Zazueat, and also c/o dysphagia.  Saw ENT following no improvement in Nov after antibiotics and had trial second antibiotics.   CT neck showed masslike enlargement of the right palatine tonsil measuring 2.6 x 2.6 x 2.3 cm with partial effacement of the right parapharyngeal space and protrusion of the right tonsil into the oropharynx.  Increased number of B/L cervical LNs measuring less than 1 cm.  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  LVI and PNI not identified.  He saw Dr. Michael De La Cruz where a physical exam showed a 3 cm R tonsil mass which has a central ulceration, probably from the biopsy. Lesion extends submucosally to the uvula and soft palate and laterally to the medial aspect of the retromolar trigone. No extension into the BOT.  Staging PET pending. [de-identified] : Patient presents s/p 6 weeks of  CRT with weekly Cisplatin ended on 4/12/21 for SCC of the right tonsil.  No odynophagia, severe recently. No dysphagia.  No thick excessive oral secretions.  mild xerostomia. No mucositis. + chronic tinnitus, slightly worse. + R otalgia less severe. No fevers, mild cough, no SOB.  \par \par

## 2022-08-12 ENCOUNTER — APPOINTMENT (OUTPATIENT)
Dept: OTOLARYNGOLOGY | Facility: CLINIC | Age: 57
End: 2022-08-12
Payer: COMMERCIAL

## 2022-08-12 VITALS
DIASTOLIC BLOOD PRESSURE: 74 MMHG | WEIGHT: 172 LBS | SYSTOLIC BLOOD PRESSURE: 113 MMHG | BODY MASS INDEX: 24.62 KG/M2 | HEART RATE: 64 BPM | HEIGHT: 70 IN

## 2022-08-12 PROCEDURE — 99214 OFFICE O/P EST MOD 30 MIN: CPT | Mod: 25

## 2022-08-12 PROCEDURE — 31575 DIAGNOSTIC LARYNGOSCOPY: CPT

## 2022-08-12 NOTE — CONSULT LETTER
[Dear  ___] : Dear  [unfilled], [Consult Letter:] : I had the pleasure of evaluating your patient, [unfilled]. [Please see my note below.] : Please see my note below. [Consult Closing:] : Thank you very much for allowing me to participate in the care of this patient.  If you have any questions, please do not hesitate to contact me. [Sincerely,] : Sincerely, [FreeTextEntry2] : Dr Yassine Zazueta [FreeTextEntry3] : \par Michael De La Cruz MD, FACS\par \par Otolaryngology-Head and Neck Surgery\par [employer] [facility]\par

## 2022-08-26 ENCOUNTER — APPOINTMENT (OUTPATIENT)
Dept: CT IMAGING | Facility: CLINIC | Age: 57
End: 2022-08-26

## 2022-08-26 ENCOUNTER — OUTPATIENT (OUTPATIENT)
Dept: OUTPATIENT SERVICES | Facility: HOSPITAL | Age: 57
LOS: 1 days | End: 2022-08-26

## 2022-08-26 DIAGNOSIS — C09.9 MALIGNANT NEOPLASM OF TONSIL, UNSPECIFIED: ICD-10-CM

## 2022-08-26 PROCEDURE — 71250 CT THORAX DX C-: CPT | Mod: 26

## 2022-09-07 ENCOUNTER — OUTPATIENT (OUTPATIENT)
Dept: OUTPATIENT SERVICES | Facility: HOSPITAL | Age: 57
LOS: 1 days | Discharge: HOME | End: 2022-09-07

## 2022-09-07 DIAGNOSIS — C09.9 MALIGNANT NEOPLASM OF TONSIL, UNSPECIFIED: ICD-10-CM

## 2022-09-09 ENCOUNTER — APPOINTMENT (OUTPATIENT)
Dept: HEMATOLOGY ONCOLOGY | Facility: CLINIC | Age: 57
End: 2022-09-09

## 2022-09-09 ENCOUNTER — RESULT REVIEW (OUTPATIENT)
Age: 57
End: 2022-09-09

## 2022-09-09 VITALS
WEIGHT: 173.19 LBS | DIASTOLIC BLOOD PRESSURE: 71 MMHG | RESPIRATION RATE: 18 BRPM | BODY MASS INDEX: 24.79 KG/M2 | HEIGHT: 70 IN | HEART RATE: 66 BPM | TEMPERATURE: 98.2 F | OXYGEN SATURATION: 98 % | SYSTOLIC BLOOD PRESSURE: 115 MMHG

## 2022-09-09 LAB
ALBUMIN SERPL ELPH-MCNC: 4.6 G/DL — SIGNIFICANT CHANGE UP (ref 3.3–5)
ALP SERPL-CCNC: 60 U/L — SIGNIFICANT CHANGE UP (ref 40–120)
ALT FLD-CCNC: 16 U/L — SIGNIFICANT CHANGE UP (ref 10–45)
ANION GAP SERPL CALC-SCNC: 12 MMOL/L — SIGNIFICANT CHANGE UP (ref 5–17)
AST SERPL-CCNC: 22 U/L — SIGNIFICANT CHANGE UP (ref 10–40)
BASOPHILS # BLD AUTO: 0.04 K/UL — SIGNIFICANT CHANGE UP (ref 0–0.2)
BASOPHILS NFR BLD AUTO: 0.7 % — SIGNIFICANT CHANGE UP (ref 0–2)
BILIRUB SERPL-MCNC: 1 MG/DL — SIGNIFICANT CHANGE UP (ref 0.2–1.2)
BUN SERPL-MCNC: 19 MG/DL — SIGNIFICANT CHANGE UP (ref 7–23)
CALCIUM SERPL-MCNC: 10.5 MG/DL — SIGNIFICANT CHANGE UP (ref 8.4–10.5)
CHLORIDE SERPL-SCNC: 103 MMOL/L — SIGNIFICANT CHANGE UP (ref 96–108)
CO2 SERPL-SCNC: 24 MMOL/L — SIGNIFICANT CHANGE UP (ref 22–31)
CREAT SERPL-MCNC: 1.04 MG/DL — SIGNIFICANT CHANGE UP (ref 0.5–1.3)
EGFR: 84 ML/MIN/1.73M2 — SIGNIFICANT CHANGE UP
EOSINOPHIL # BLD AUTO: 0.19 K/UL — SIGNIFICANT CHANGE UP (ref 0–0.5)
EOSINOPHIL NFR BLD AUTO: 3.2 % — SIGNIFICANT CHANGE UP (ref 0–6)
GLUCOSE SERPL-MCNC: 99 MG/DL — SIGNIFICANT CHANGE UP (ref 70–99)
HCT VFR BLD CALC: 43.2 % — SIGNIFICANT CHANGE UP (ref 39–50)
HGB BLD-MCNC: 14.7 G/DL — SIGNIFICANT CHANGE UP (ref 13–17)
IMM GRANULOCYTES NFR BLD AUTO: 0.2 % — SIGNIFICANT CHANGE UP (ref 0–1.5)
LYMPHOCYTES # BLD AUTO: 1 K/UL — SIGNIFICANT CHANGE UP (ref 1–3.3)
LYMPHOCYTES # BLD AUTO: 17.1 % — SIGNIFICANT CHANGE UP (ref 13–44)
MAGNESIUM SERPL-MCNC: 2.1 MG/DL — SIGNIFICANT CHANGE UP (ref 1.6–2.6)
MCHC RBC-ENTMCNC: 28.8 PG — SIGNIFICANT CHANGE UP (ref 27–34)
MCHC RBC-ENTMCNC: 34 GM/DL — SIGNIFICANT CHANGE UP (ref 32–36)
MCV RBC AUTO: 84.5 FL — SIGNIFICANT CHANGE UP (ref 80–100)
MONOCYTES # BLD AUTO: 0.61 K/UL — SIGNIFICANT CHANGE UP (ref 0–0.9)
MONOCYTES NFR BLD AUTO: 10.4 % — SIGNIFICANT CHANGE UP (ref 2–14)
NEUTROPHILS # BLD AUTO: 4 K/UL — SIGNIFICANT CHANGE UP (ref 1.8–7.4)
NEUTROPHILS NFR BLD AUTO: 68.4 % — SIGNIFICANT CHANGE UP (ref 43–77)
NRBC # BLD: 0 /100 WBCS — SIGNIFICANT CHANGE UP (ref 0–0)
PLATELET # BLD AUTO: 172 K/UL — SIGNIFICANT CHANGE UP (ref 150–400)
POTASSIUM SERPL-MCNC: 5.5 MMOL/L — HIGH (ref 3.5–5.3)
POTASSIUM SERPL-SCNC: 5.5 MMOL/L — HIGH (ref 3.5–5.3)
PROT SERPL-MCNC: 7 G/DL — SIGNIFICANT CHANGE UP (ref 6–8.3)
RBC # BLD: 5.11 M/UL — SIGNIFICANT CHANGE UP (ref 4.2–5.8)
RBC # FLD: 13.4 % — SIGNIFICANT CHANGE UP (ref 10.3–14.5)
SODIUM SERPL-SCNC: 139 MMOL/L — SIGNIFICANT CHANGE UP (ref 135–145)
T3FREE SERPL-MCNC: 3.09 PG/ML — SIGNIFICANT CHANGE UP (ref 2–4.4)
T4 FREE SERPL-MCNC: 1 NG/DL — SIGNIFICANT CHANGE UP (ref 0.9–1.8)
TSH SERPL-MCNC: 3.37 UIU/ML — SIGNIFICANT CHANGE UP (ref 0.27–4.2)
WBC # BLD: 5.85 K/UL — SIGNIFICANT CHANGE UP (ref 3.8–10.5)
WBC # FLD AUTO: 5.85 K/UL — SIGNIFICANT CHANGE UP (ref 3.8–10.5)

## 2022-09-09 PROCEDURE — 99215 OFFICE O/P EST HI 40 MIN: CPT

## 2022-09-09 NOTE — RESULTS/DATA
[FreeTextEntry1] : 8/8/22 CT Chest: Resolution of ill-defined previously new lower lobe nodules. Stable sub-3 mm lung nodules. No new or enlarging lung nodule. Dilated aortic root, unchanged.\par \par 6/7/22 CT Neck: Stable exam. No evidence of recurrent or metastatic disease.\par 6/7/22 CT Chest: Several new bilateral lower lobe pulmonary nodular opacities measuring up to 0.5 cm; indeterminate, but may be infectious/inflammatory in etiology. Recommend CT chest follow-up in 3 months to assess for stability/clearing.\par \par 11/20/21 CT Neck: No evidence of recurrent or metastatic disease.\par \par 7/19/2021 PET:\par 1. Interval resolution of FDG avid right tonsillar mass.\par 2. Interval resolution of minimally FDG avid bilateral cervical lymph nodes.\par 3. Nonspecific new mild hypermetabolism in the distal esophagus without CT correlate, physiologic versus inflammatory. Please correlate clinically or with endoscopy as indicated.\par 4. Mild hypermetabolism mapping to a segment of small bowel in the right lower quadrant, also seen on prior study and not significantly changed, probably physiologic. 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  LVI and PNI not identified \par \par 12/18/20 CT Neck:\par masslike enlargement of the right palatine tonsil measuring 2.6 x 2.6 x 2.3 cm with partial effacement of the right parapharyngeal space and protrusion of the right tonsil into the oropharynx.  Increased number of B/L cervical LNs measuring less than 1 cm.

## 2022-09-09 NOTE — HISTORY OF PRESENT ILLNESS
Problem: Goal Outcome Summary  Goal: Goal Outcome Summary  Outcome: No Change  VSS. No complaints of pain or nausea this shift. Blood sugar at 0230 was 65, she was given 2 apple juices, re-check was then 55 -> 63 -> 112. She was asymptomatic at this time. She's been calling appropriately and is up with [facility]. Continue plan of care.        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  Patient c/o a sore throat and right otalgia since 11/20 with no relief from antibiotics.  He saw ENT, Dr. Yassine Zazueta, and also c/o dysphagia.  Saw ENT following no improvement in Nov after antibiotics and had trial second antibiotics.   CT neck showed masslike enlargement of the right palatine tonsil measuring 2.6 x 2.6 x 2.3 cm with partial effacement of the right parapharyngeal space and protrusion of the right tonsil into the oropharynx.  Increased number of B/L cervical LNs measuring less than 1 cm.  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  LVI and PNI not identified.  He saw Dr. Michael De La Cruz where a physical exam showed a 3 cm R tonsil mass which has a central ulceration, probably from the biopsy. Lesion extends submucosally to the uvula and soft palate and laterally to the medial aspect of the retromolar trigone. No extension into the BOT.  Staging PET pending. [de-identified] : Patient presents s/p 6 weeks of  CRT with weekly Cisplatin ended on 4/12/21 for SCC of the right tonsil.  No odynophagia, severe recently. No dysphagia.  No thick excessive oral secretions.  mild xerostomia. No mucositis. + chronic tinnitus, slightly worse - began 9/11. + R otalgia less severe. No fevers, mild cough, no SOB.  \par \par

## 2022-09-11 DIAGNOSIS — K25.9 GASTRIC ULCER, UNSPECIFIED AS ACUTE OR CHRONIC, WITHOUT HEMORRHAGE OR PERFORATION: ICD-10-CM

## 2022-09-11 DIAGNOSIS — I77.810 THORACIC AORTIC ECTASIA: ICD-10-CM

## 2022-09-11 DIAGNOSIS — H91.93 UNSPECIFIED HEARING LOSS, BILATERAL: ICD-10-CM

## 2022-09-12 ENCOUNTER — NON-APPOINTMENT (OUTPATIENT)
Age: 57
End: 2022-09-12

## 2022-09-13 ENCOUNTER — APPOINTMENT (OUTPATIENT)
Dept: CARDIOLOGY | Facility: CLINIC | Age: 57
End: 2022-09-13

## 2022-09-13 ENCOUNTER — NON-APPOINTMENT (OUTPATIENT)
Age: 57
End: 2022-09-13

## 2022-09-13 VITALS
SYSTOLIC BLOOD PRESSURE: 110 MMHG | RESPIRATION RATE: 16 BRPM | HEART RATE: 72 BPM | HEIGHT: 70 IN | BODY MASS INDEX: 24.91 KG/M2 | DIASTOLIC BLOOD PRESSURE: 60 MMHG | WEIGHT: 174 LBS

## 2022-09-13 PROCEDURE — 93000 ELECTROCARDIOGRAM COMPLETE: CPT

## 2022-09-13 PROCEDURE — 99204 OFFICE O/P NEW MOD 45 MIN: CPT | Mod: 25

## 2022-09-13 RX ORDER — GABAPENTIN 300 MG/1
300 CAPSULE ORAL
Qty: 30 | Refills: 1 | Status: DISCONTINUED | COMMUNITY
Start: 2021-10-14 | End: 2022-09-13

## 2022-10-13 PROBLEM — M99.00 CRANIAL SOMATIC DYSFUNCTION: Status: ACTIVE | Noted: 2021-09-14

## 2022-10-27 ENCOUNTER — APPOINTMENT (OUTPATIENT)
Dept: ORTHOPEDIC SURGERY | Facility: CLINIC | Age: 57
End: 2022-10-27

## 2022-10-27 VITALS
HEIGHT: 70 IN | TEMPERATURE: 98.1 F | WEIGHT: 170 LBS | HEART RATE: 68 BPM | SYSTOLIC BLOOD PRESSURE: 121 MMHG | DIASTOLIC BLOOD PRESSURE: 78 MMHG | BODY MASS INDEX: 24.34 KG/M2

## 2022-10-27 DIAGNOSIS — Z92.3 PERSONAL HISTORY OF IRRADIATION: ICD-10-CM

## 2022-10-27 DIAGNOSIS — M47.816 SPONDYLOSIS W/OUT MYELOPATHY OR RADICULOPATHY, LUMBAR REGION: ICD-10-CM

## 2022-10-27 DIAGNOSIS — M47.812 SPONDYLOSIS W/OUT MYELOPATHY OR RADICULOPATHY, CERVICAL REGION: ICD-10-CM

## 2022-10-27 PROCEDURE — 72100 X-RAY EXAM L-S SPINE 2/3 VWS: CPT

## 2022-10-27 PROCEDURE — 99203 OFFICE O/P NEW LOW 30 MIN: CPT

## 2022-10-27 PROCEDURE — 72040 X-RAY EXAM NECK SPINE 2-3 VW: CPT
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
## 2022-10-27 NOTE — PHYSICAL EXAM
[Normal] : Gait: normal [de-identified] : CONSTITUTIONAL: The patient is a very pleasant individual who is well-nourished and who appears stated age.\par PSYCHIATRIC: The patient is alert and oriented X 3 and in no apparent distress, and participates with orthopedic evaluation well.\par HEAD: Atraumatic and is nonsyndromic in appearance.\par EENT: No visible thyromegaly, EOMI.\par RESPIRATORY: Respiratory rate is regular, not dyspneic on examination.\par LYMPHATICS: There is no inguinal lymphadenopathy\par INTEGUMENTARY: Skin is clean, dry, and intact about the bilateral lower extremities and lumbar spine.\par VASCULAR: There is brisk capillary refill about the bilateral lower extremities.\par NEUROLOGIC: There are no pathologic reflexes. There is no decrease in sensation of the bilateral lower extremities on Wartenberg pinwheel examination. Deep tendon reflexes are well maintained at 2+/4 of the bilateral lower extremities and are symmetric..\par MUSCULOSKELETAL: There is no visible muscular atrophy. Manual motor strength is well maintained in the bilateral lower extremities. Range of motion of lumbar spine is well maintained. The patient ambulates in a non-myelopathic manner. Negative tension sign and straight leg raise bilaterally. Quad extension, ankle dorsiflexion, EHL, plantar flexion, and ankle eversion are well preserved. Normal secondary orthopaedic exam of bilateral hips, greater trochanteric area, knees and ankles, conically orientated neck and low back pain consistent with myositis [de-identified] : X-rays been reviewed on today's date of October 27, 2022 2 views of the cervical spine and 2 views of the lumbar spine cervical spine shows cervical degenerative disc disease 5 6 and 6767 appears to be the worst lumbar degenerative disc disease mild at L4-5 and L5-S1.  PET scan was reviewed from [facility] from 2021 that shows no bone hyperactivity Abdomen soft, nontender, nondistended, bowel sounds present in all 4 quadrants.

## 2022-10-27 NOTE — DISCUSSION/SUMMARY
[de-identified] : Very pleasant gentleman with a longstanding history of cervical and neck pain.  As stated in the HPI he did very well with previous trigger point injections.  Patient is going to be started on a course of steroids no NSAIDs will be prescribed secondary to GI sensitivity.  Physical therapy is can also be initiated.  If this fails to control his signs and symptoms to his satisfaction patient will be referred to physical medicine and rehabilitation for consideration of trigger point therapy.

## 2022-11-11 ENCOUNTER — RESULT REVIEW (OUTPATIENT)
Age: 57
End: 2022-11-11

## 2022-11-11 ENCOUNTER — APPOINTMENT (OUTPATIENT)
Dept: OTOLARYNGOLOGY | Facility: CLINIC | Age: 57
End: 2022-11-11

## 2022-11-11 ENCOUNTER — LABORATORY RESULT (OUTPATIENT)
Age: 57
End: 2022-11-11

## 2022-11-11 PROCEDURE — 31575 DIAGNOSTIC LARYNGOSCOPY: CPT

## 2022-11-11 PROCEDURE — 99214 OFFICE O/P EST MOD 30 MIN: CPT | Mod: 25

## 2022-11-11 PROCEDURE — 42800 BIOPSY OF THROAT: CPT

## 2022-11-11 RX ORDER — PILOCARPINE HYDROCHLORIDE 5 MG/1
5 TABLET, FILM COATED ORAL 3 TIMES DAILY
Qty: 90 | Refills: 0 | Status: COMPLETED | COMMUNITY
Start: 2021-03-26 | End: 2022-11-11

## 2022-11-11 NOTE — CONSULT LETTER
[Dear  ___] : Dear  [unfilled], [Consult Letter:] : I had the pleasure of evaluating your patient, [unfilled]. [Please see my note below.] : Please see my note below. [Consult Closing:] : Thank you very much for allowing me to participate in the care of this patient.  If you have any questions, please do not hesitate to contact me. [Sincerely,] : Sincerely, [FreeTextEntry2] : Dr Yassine Zazueta [FreeTextEntry3] : \par Michael De La Cruz MD, FACS\par \par Otolaryngology-Head and Neck Surgery\par [employer] [facility]\par

## 2022-11-11 NOTE — HISTORY OF PRESENT ILLNESS
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 This was found on biopsy done 12/29/2020. \par Pt quite smoking 22 years ago, was smoking 1pack/day for 15 years. \par Completed CRT 4/12/21.  PET done on July 19, 2021. CT chest 8/26/22 IMPRESSION:\par 1.  Resolution of ill-defined previously new lower lobe nodules. Stable sub-3 mm lung nodules. No new or enlarging lung nodule.\par 2.  Dilated aortic root, unchanged\par \par CT neck 67/22 - [facility]\par \par Pt c/o soreness in the R throat area.  Denies dysphagia.  \par Complete review of systems which was performed during a previous encounter was reviewed with the patient and there are no changes except as stated in the HPI section.\par \par \par \par

## 2022-11-15 ENCOUNTER — NON-APPOINTMENT (OUTPATIENT)
Age: 57
End: 2022-11-15

## 2022-12-13 ENCOUNTER — APPOINTMENT (OUTPATIENT)
Dept: CARDIOLOGY | Facility: CLINIC | Age: 57
End: 2022-12-13
Payer: COMMERCIAL

## 2022-12-13 PROCEDURE — 93015 CV STRESS TEST SUPVJ I&R: CPT

## 2022-12-13 PROCEDURE — 93306 TTE W/DOPPLER COMPLETE: CPT

## 2022-12-16 ENCOUNTER — NON-APPOINTMENT (OUTPATIENT)
Age: 57
End: 2022-12-16

## 2022-12-16 ENCOUNTER — APPOINTMENT (OUTPATIENT)
Dept: CARDIOLOGY | Facility: CLINIC | Age: 57
End: 2022-12-16

## 2022-12-16 VITALS
HEART RATE: 74 BPM | WEIGHT: 174 LBS | BODY MASS INDEX: 24.91 KG/M2 | RESPIRATION RATE: 16 BRPM | DIASTOLIC BLOOD PRESSURE: 74 MMHG | HEIGHT: 70 IN | SYSTOLIC BLOOD PRESSURE: 108 MMHG

## 2022-12-16 DIAGNOSIS — I89.0 LYMPHEDEMA, NOT ELSEWHERE CLASSIFIED: ICD-10-CM

## 2022-12-16 PROCEDURE — 99214 OFFICE O/P EST MOD 30 MIN: CPT | Mod: 25

## 2022-12-16 PROCEDURE — 93000 ELECTROCARDIOGRAM COMPLETE: CPT

## 2022-12-16 RX ORDER — PANTOPRAZOLE 40 MG/1
40 TABLET, DELAYED RELEASE ORAL
Qty: 90 | Refills: 0 | Status: DISCONTINUED | COMMUNITY
Start: 2022-08-16 | End: 2022-12-16

## 2022-12-16 RX ORDER — METHYLPREDNISOLONE 4 MG/1
4 TABLET ORAL
Qty: 1 | Refills: 0 | Status: DISCONTINUED | COMMUNITY
Start: 2022-10-27 | End: 2022-12-16

## 2022-12-16 NOTE — HISTORY OF PRESENT ILLNESS
[FreeTextEntry1] : He is usually physically active and bicycles many miles per week during the season;\par \par Exercise stress test performed December 13, 2022 showed excellent exercise tolerance with 13 METS workload into stage V.  No symptoms of chest pain.  No arrhythmias were seen.  Blood pressure response was normal.  EKGs demonstrated negative for ST abnormality or ischemia–i.e. negative test; \par \par Transthoracic echocardiogram showed preserved left ventricular size and systolic function with a EF 60 to 65%.  Mild MR and TR.  There was mild dilatation of the aortic root at 3.94 cm with normal ascending aortic dimensions.;

## 2022-12-16 NOTE — REASON FOR VISIT
[Carotid, Aortic and Peripheral Vascular Disease] : carotid, aortic and peripheral vascular disease [FreeTextEntry3] : BIBIANA Snider [FreeTextEntry1] : The patient is a 57-year-old white male who returns to the office for follow-up evaluation for cardiac testing.  He has a history of mild enlarged ascending aorta/aortic root noted on a recent CT scan at 4.0 cm.\par \par The patient has otherwise no prior significant cardiac history; however he has been treated with radiation and chemotherapy for throat cancer in 2021--and appears to have recuperated nicely with follow-up checkups reported "stable";\par \par He denies chest pain, shortness of breath, palpitations or dizziness;

## 2022-12-16 NOTE — ASSESSMENT
[FreeTextEntry1] : EKG shows normal sinus rhythm at a rate of 74.  It appears within normal limits;\par \par In summary this 57-year-old gentleman has a history of mild dilatation of the aortic root with stable recent cardiac testing including negative or normal cardiac stress test.;\par He has been asymptomatic from the cardiac standpoint;\par \par \par \par Plan:\par \par Patient reassured;\par \par No additional cardiac work-up indicated at this time;\par \par No cardiac contraindication to performing moderate or unrestricted exercise regimen;\par \par Return to office within 6 months or as needed;\par \par Regular checkups and laboratory blood test with primary care, oncology and otolaryngology encouraged;

## 2022-12-23 ENCOUNTER — APPOINTMENT (OUTPATIENT)
Dept: OTOLARYNGOLOGY | Facility: CLINIC | Age: 57
End: 2022-12-23

## 2022-12-23 VITALS
BODY MASS INDEX: 24.91 KG/M2 | HEART RATE: 73 BPM | DIASTOLIC BLOOD PRESSURE: 79 MMHG | TEMPERATURE: 98.1 F | SYSTOLIC BLOOD PRESSURE: 123 MMHG | WEIGHT: 174 LBS | HEIGHT: 70 IN

## 2022-12-23 PROCEDURE — 31575 DIAGNOSTIC LARYNGOSCOPY: CPT

## 2022-12-23 PROCEDURE — 99214 OFFICE O/P EST MOD 30 MIN: CPT | Mod: 25

## 2022-12-23 RX ORDER — PANTOPRAZOLE 20 MG/1
20 TABLET, DELAYED RELEASE ORAL
Refills: 0 | Status: ACTIVE | COMMUNITY

## 2022-12-23 NOTE — HISTORY OF PRESENT ILLNESS
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 This was found on biopsy done 12/29/2020. \par Pt quite smoking 22 years ago, was smoking 1pack/day for 15 years. \par Completed CRT 4/12/21.  PET done on July 19, 2021. CT chest 8/26/22 IMPRESSION:\par 1.  Resolution of ill-defined previously new lower lobe nodules. Stable sub-3 mm lung nodules. No new or enlarging lung nodule.\par 2.  Dilated aortic root, unchanged\par \par CT neck 6/7/22 - [facility]\par \par R tonsil biopsy on 11/11/22 - negative for cancer.  Since the biopsy, he has nasal dryness and R otalgia.  Pt feels his sinuses are congested.  \par \par Complete review of systems which was performed during a previous encounter was reviewed with the patient and there are no changes except as stated in the HPI section.\par \par \par \par

## 2022-12-23 NOTE — CONSULT LETTER
[Dear  ___] : Dear  [unfilled], [Consult Letter:] : I had the pleasure of evaluating your patient, [unfilled]. [Please see my note below.] : Please see my note below. [Consult Closing:] : Thank you very much for allowing me to participate in the care of this patient.  If you have any questions, please do not hesitate to contact me. [Sincerely,] : Sincerely, [FreeTextEntry2] : Dr Yassine Zazueta [FreeTextEntry3] : \par Michael De La Cruz MD, FACS\par \par Otolaryngology-Head and Neck Surgery\par [employer] [facility]\par

## 2023-01-01 NOTE — REVIEW OF SYSTEMS
[FreeTextEntry4] : otalgia
You can access the FollowMyHealth Patient Portal offered by [facility] by registering at the following website: [email or web address]. By joining [facility]’s FollowMyHealth portal, you will also be able to view your health information using other applications (apps) compatible with our system.

## 2023-02-07 ENCOUNTER — APPOINTMENT (OUTPATIENT)
Dept: CT IMAGING | Facility: CLINIC | Age: 58
End: 2023-02-07
Payer: COMMERCIAL

## 2023-02-07 ENCOUNTER — OUTPATIENT (OUTPATIENT)
Dept: OUTPATIENT SERVICES | Facility: HOSPITAL | Age: 58
LOS: 1 days | End: 2023-02-07

## 2023-02-07 DIAGNOSIS — C09.9 MALIGNANT NEOPLASM OF TONSIL, UNSPECIFIED: ICD-10-CM

## 2023-02-07 PROCEDURE — 70491 CT SOFT TISSUE NECK W/DYE: CPT | Mod: 26

## 2023-02-07 PROCEDURE — 71260 CT THORAX DX C+: CPT | Mod: 26

## 2023-02-11 ENCOUNTER — OUTPATIENT (OUTPATIENT)
Dept: OUTPATIENT SERVICES | Facility: HOSPITAL | Age: 58
LOS: 1 days | Discharge: HOME | End: 2023-02-11

## 2023-02-11 DIAGNOSIS — C09.9 MALIGNANT NEOPLASM OF TONSIL, UNSPECIFIED: ICD-10-CM

## 2023-02-15 ENCOUNTER — RESULT REVIEW (OUTPATIENT)
Age: 58
End: 2023-02-15

## 2023-02-15 ENCOUNTER — APPOINTMENT (OUTPATIENT)
Dept: HEMATOLOGY ONCOLOGY | Facility: CLINIC | Age: 58
End: 2023-02-15
Payer: COMMERCIAL

## 2023-02-15 VITALS
HEART RATE: 75 BPM | WEIGHT: 178 LBS | TEMPERATURE: 98 F | SYSTOLIC BLOOD PRESSURE: 127 MMHG | BODY MASS INDEX: 25.48 KG/M2 | RESPIRATION RATE: 18 BRPM | HEIGHT: 70 IN | OXYGEN SATURATION: 98 % | DIASTOLIC BLOOD PRESSURE: 77 MMHG

## 2023-02-15 DIAGNOSIS — F41.9 ANXIETY DISORDER, UNSPECIFIED: ICD-10-CM

## 2023-02-15 DIAGNOSIS — H91.93 UNSPECIFIED HEARING LOSS, BILATERAL: ICD-10-CM

## 2023-02-15 DIAGNOSIS — K25.9 GASTRIC ULCER, UNSPECIFIED AS ACUTE OR CHRONIC, WITHOUT HEMORRHAGE OR PERFORATION: ICD-10-CM

## 2023-02-15 DIAGNOSIS — I77.810 THORACIC AORTIC ECTASIA: ICD-10-CM

## 2023-02-15 LAB
ALBUMIN SERPL ELPH-MCNC: 4.9 G/DL — SIGNIFICANT CHANGE UP (ref 3.3–5)
ALP SERPL-CCNC: 59 U/L — SIGNIFICANT CHANGE UP (ref 40–120)
ALT FLD-CCNC: 15 U/L — SIGNIFICANT CHANGE UP (ref 10–45)
ANION GAP SERPL CALC-SCNC: 12 MMOL/L — SIGNIFICANT CHANGE UP (ref 5–17)
AST SERPL-CCNC: 20 U/L — SIGNIFICANT CHANGE UP (ref 10–40)
BASOPHILS # BLD AUTO: 0.05 K/UL — SIGNIFICANT CHANGE UP (ref 0–0.2)
BASOPHILS NFR BLD AUTO: 1 % — SIGNIFICANT CHANGE UP (ref 0–2)
BILIRUB SERPL-MCNC: 0.8 MG/DL — SIGNIFICANT CHANGE UP (ref 0.2–1.2)
BUN SERPL-MCNC: 15 MG/DL — SIGNIFICANT CHANGE UP (ref 7–23)
CALCIUM SERPL-MCNC: 10 MG/DL — SIGNIFICANT CHANGE UP (ref 8.4–10.5)
CHLORIDE SERPL-SCNC: 102 MMOL/L — SIGNIFICANT CHANGE UP (ref 96–108)
CO2 SERPL-SCNC: 26 MMOL/L — SIGNIFICANT CHANGE UP (ref 22–31)
CREAT SERPL-MCNC: 1.08 MG/DL — SIGNIFICANT CHANGE UP (ref 0.5–1.3)
EGFR: 80 ML/MIN/1.73M2 — SIGNIFICANT CHANGE UP
EOSINOPHIL # BLD AUTO: 0.24 K/UL — SIGNIFICANT CHANGE UP (ref 0–0.5)
EOSINOPHIL NFR BLD AUTO: 4.7 % — SIGNIFICANT CHANGE UP (ref 0–6)
GLUCOSE SERPL-MCNC: 88 MG/DL — SIGNIFICANT CHANGE UP (ref 70–99)
HCT VFR BLD CALC: 44 % — SIGNIFICANT CHANGE UP (ref 39–50)
HGB BLD-MCNC: 15 G/DL — SIGNIFICANT CHANGE UP (ref 13–17)
IMM GRANULOCYTES NFR BLD AUTO: 0.2 % — SIGNIFICANT CHANGE UP (ref 0–0.9)
LYMPHOCYTES # BLD AUTO: 1.07 K/UL — SIGNIFICANT CHANGE UP (ref 1–3.3)
LYMPHOCYTES # BLD AUTO: 21 % — SIGNIFICANT CHANGE UP (ref 13–44)
MAGNESIUM SERPL-MCNC: 2.3 MG/DL — SIGNIFICANT CHANGE UP (ref 1.6–2.6)
MCHC RBC-ENTMCNC: 28.6 PG — SIGNIFICANT CHANGE UP (ref 27–34)
MCHC RBC-ENTMCNC: 34.1 GM/DL — SIGNIFICANT CHANGE UP (ref 32–36)
MCV RBC AUTO: 83.8 FL — SIGNIFICANT CHANGE UP (ref 80–100)
MONOCYTES # BLD AUTO: 0.52 K/UL — SIGNIFICANT CHANGE UP (ref 0–0.9)
MONOCYTES NFR BLD AUTO: 10.2 % — SIGNIFICANT CHANGE UP (ref 2–14)
NEUTROPHILS # BLD AUTO: 3.2 K/UL — SIGNIFICANT CHANGE UP (ref 1.8–7.4)
NEUTROPHILS NFR BLD AUTO: 62.9 % — SIGNIFICANT CHANGE UP (ref 43–77)
NRBC # BLD: 0 /100 WBCS — SIGNIFICANT CHANGE UP (ref 0–0)
PLATELET # BLD AUTO: 183 K/UL — SIGNIFICANT CHANGE UP (ref 150–400)
POTASSIUM SERPL-MCNC: 4.2 MMOL/L — SIGNIFICANT CHANGE UP (ref 3.5–5.3)
POTASSIUM SERPL-SCNC: 4.2 MMOL/L — SIGNIFICANT CHANGE UP (ref 3.5–5.3)
PROT SERPL-MCNC: 7.3 G/DL — SIGNIFICANT CHANGE UP (ref 6–8.3)
RBC # BLD: 5.25 M/UL — SIGNIFICANT CHANGE UP (ref 4.2–5.8)
RBC # FLD: 13.2 % — SIGNIFICANT CHANGE UP (ref 10.3–14.5)
SODIUM SERPL-SCNC: 140 MMOL/L — SIGNIFICANT CHANGE UP (ref 135–145)
T3FREE SERPL-MCNC: 2.9 PG/ML — SIGNIFICANT CHANGE UP (ref 2–4.4)
T4 FREE SERPL-MCNC: 0.9 NG/DL — SIGNIFICANT CHANGE UP (ref 0.9–1.8)
TSH SERPL-MCNC: 8.02 UIU/ML — HIGH (ref 0.27–4.2)
WBC # BLD: 5.09 K/UL — SIGNIFICANT CHANGE UP (ref 3.8–10.5)
WBC # FLD AUTO: 5.09 K/UL — SIGNIFICANT CHANGE UP (ref 3.8–10.5)

## 2023-02-15 PROCEDURE — 99215 OFFICE O/P EST HI 40 MIN: CPT

## 2023-02-15 NOTE — HISTORY OF PRESENT ILLNESS
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  Patient c/o a sore throat and right otalgia since 11/20 with no relief from antibiotics.  He saw ENT, Dr. Yassine Zazueta, and also c/o dysphagia.  Saw ENT following no improvement in Nov after antibiotics and had trial second antibiotics.   CT neck showed masslike enlargement of the right palatine tonsil measuring 2.6 x 2.6 x 2.3 cm with partial effacement of the right parapharyngeal space and protrusion of the right tonsil into the oropharynx.  Increased number of B/L cervical LNs measuring less than 1 cm.  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  LVI and PNI not identified.  He saw Dr. Michael De La Cruz where a physical exam showed a 3 cm R tonsil mass which has a central ulceration, probably from the biopsy. Lesion extends submucosally to the uvula and soft palate and laterally to the medial aspect of the retromolar trigone. No extension into the BOT.  Staging PET pending. [de-identified] : Patient presents s/p 6 weeks of  CRT with weekly Cisplatin ended on 4/12/21 for SCC of the right tonsil.  No odynophagia, severe recently. No dysphagia.  No thick excessive oral secretions.  mild xerostomia. No mucositis. + chronic tinnitus, slightly worse - began 9/11. + R otalgia less severe. No fevers, mild cough, no SOB.  \par \par

## 2023-02-15 NOTE — RESULTS/DATA
[FreeTextEntry1] : 2/7/23 CT Neck: Evaluation of the oropharynx is nondiagnostic due to motion/swallowing artifact. Consider repeat exam if clinically warranted. No enlarged cervical lymph nodes.\par 2/7/23 CT Chest: Couple of stable sub-3 mm lung nodules since 6/7/2022. No new or enlarging lung nodule. Dilated aortic root, unchanged.\par \par 8/8/22 CT Chest: Resolution of ill-defined previously new lower lobe nodules. Stable sub-3 mm lung nodules. No new or enlarging lung nodule. Dilated aortic root, unchanged.\par \par 6/7/22 CT Neck: Stable exam. No evidence of recurrent or metastatic disease.\par 6/7/22 CT Chest: Several new bilateral lower lobe pulmonary nodular opacities measuring up to 0.5 cm; indeterminate, but may be infectious/inflammatory in etiology. Recommend CT chest follow-up in 3 months to assess for stability/clearing.\par \par 11/20/21 CT Neck: No evidence of recurrent or metastatic disease.\par \par 7/19/2021 PET:\par 1. Interval resolution of FDG avid right tonsillar mass.\par 2. Interval resolution of minimally FDG avid bilateral cervical lymph nodes.\par 3. Nonspecific new mild hypermetabolism in the distal esophagus without CT correlate, physiologic versus inflammatory. Please correlate clinically or with endoscopy as indicated.\par 4. Mild hypermetabolism mapping to a segment of small bowel in the right lower quadrant, also seen on prior study and not significantly changed, probably physiologic. 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  LVI and PNI not identified \par \par 12/18/20 CT Neck:\par masslike enlargement of the right palatine tonsil measuring 2.6 x 2.6 x 2.3 cm with partial effacement of the right parapharyngeal space and protrusion of the right tonsil into the oropharynx.  Increased number of B/L cervical LNs measuring less than 1 cm.

## 2023-02-16 ENCOUNTER — NON-APPOINTMENT (OUTPATIENT)
Age: 58
End: 2023-02-16

## 2023-02-17 ENCOUNTER — APPOINTMENT (OUTPATIENT)
Dept: NEUROLOGY | Facility: CLINIC | Age: 58
End: 2023-02-17
Payer: COMMERCIAL

## 2023-02-17 VITALS
SYSTOLIC BLOOD PRESSURE: 124 MMHG | BODY MASS INDEX: 25.48 KG/M2 | WEIGHT: 178 LBS | HEIGHT: 70 IN | DIASTOLIC BLOOD PRESSURE: 72 MMHG

## 2023-02-17 PROCEDURE — 99204 OFFICE O/P NEW MOD 45 MIN: CPT

## 2023-02-17 RX ORDER — MOMETASONE FUROATE 1 MG/G
0.1 OINTMENT TOPICAL
Qty: 15 | Refills: 0 | Status: COMPLETED | COMMUNITY
Start: 2022-10-19 | End: 2023-02-17
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
## 2023-02-17 NOTE — PHYSICAL EXAM
[General Appearance - Alert] : alert [General Appearance - In No Acute Distress] : in no acute distress [General Appearance - Well-Appearing] : healthy appearing [Oriented To Time, Place, And Person] : oriented to person, place, and time [Impaired Insight] : insight and judgment were intact [Affect] : the affect was normal [Person] : oriented to person [Place] : oriented to place [Time] : oriented to time [Concentration Intact] : normal concentrating ability [Visual Intact] : visual attention was ~T not ~L decreased [Naming Objects] : no difficulty naming common objects [Repeating Phrases] : no difficulty repeating a phrase [Writing A Sentence] : no difficulty writing a sentence [Fluency] : fluency intact [Comprehension] : comprehension intact [Reading] : reading intact [Past History] : adequate knowledge of personal past history [Total Score ___ / 30] : the patient achieved a score of [unfilled] /30 [Cranial Nerves Optic (II)] : visual acuity intact bilaterally,  visual fields full to confrontation, pupils equal round and reactive to light [Cranial Nerves Oculomotor (III)] : extraocular motion intact [Cranial Nerves Trigeminal (V)] : facial sensation intact symmetrically [Cranial Nerves Facial (VII)] : face symmetrical [Cranial Nerves Vestibulocochlear (VIII)] : hearing was intact bilaterally [Cranial Nerves Glossopharyngeal (IX)] : tongue and palate midline [Cranial Nerves Accessory (XI - Cranial And Spinal)] : head turning and shoulder shrug symmetric [Cranial Nerves Hypoglossal (XII)] : there was no tongue deviation with protrusion [Motor Tone] : muscle tone was normal in all four extremities [Motor Strength] : muscle strength was normal in all four extremities [No Muscle Atrophy] : normal bulk in all four extremities [Paresis Pronator Drift Right-Sided] : no pronator drift on the right [Paresis Pronator Drift Left-Sided] : no pronator drift on the left [Sensation Tactile Decrease] : light touch was intact [Sensation Pain / Temperature Decrease] : pain and temperature was intact [Romberg's Sign] : Romberg's sign was negtive [Abnormal Walk] : normal gait [Balance] : balance was intact [Past-pointing] : there was no past-pointing [Tremor] : no tremor present [Coordination - Dysmetria Impaired Finger-to-Nose Bilateral] : not present [Coordination - Dysmetria Impaired Heel-to-Shin Bilateral] : not present [2+] : Ankle jerk left 2+ [Plantar Reflex Right Only] : normal on the right [Plantar Reflex Left Only] : normal on the left [PERRL With Normal Accommodation] : pupils were equal in size, round, reactive to light, with normal accommodation [Extraocular Movements] : extraocular movements were intact [Full Visual Field] : full visual field

## 2023-02-17 NOTE — CONSULT LETTER
[Dear  ___] : Dear  [unfilled], [Consult Letter:] : I had the pleasure of evaluating your patient, [unfilled]. [Please see my note below.] : Please see my note below. [Consult Closing:] : Thank you very much for allowing me to participate in the care of this patient.  If you have any questions, please do not hesitate to contact me. [Sincerely,] : Sincerely, [FreeTextEntry3] : Gomez Olmstead MD, PhD, DPN-N\par [facility]\par Neurology at [place]\par Chief, Division of Neurology\par [facility]\par

## 2023-02-17 NOTE — ASSESSMENT
[FreeTextEntry1] : Subjective short-term memory loss\par Did fine on mental status testing in the office today\par His general neurological examination is normal\par Possibly this stems from the chemotherapy he received\par To look for possible reversible etiologies we will check a brain MRI, EEG, thyroid functions and B12\par Further discussions to follow

## 2023-02-24 ENCOUNTER — APPOINTMENT (OUTPATIENT)
Dept: NEUROLOGY | Facility: CLINIC | Age: 58
End: 2023-02-24
Payer: COMMERCIAL

## 2023-02-24 LAB
FOLATE SERPL-MCNC: 17.9 NG/ML
TSH SERPL-ACNC: 3.1 UIU/ML
VIT B12 SERPL-MCNC: 542 PG/ML

## 2023-02-24 PROCEDURE — 93040 RHYTHM ECG WITH REPORT: CPT

## 2023-02-24 PROCEDURE — 95819 EEG AWAKE AND ASLEEP: CPT

## 2023-02-28 LAB — METHYLMALONATE SERPL-SCNC: 232 NMOL/L

## 2023-03-06 LAB — T4 FREE SERPL DIALY-MCNC: 0.75 NG/DL

## 2023-04-14 ENCOUNTER — APPOINTMENT (OUTPATIENT)
Dept: OTOLARYNGOLOGY | Facility: CLINIC | Age: 58
End: 2023-04-14
Payer: COMMERCIAL

## 2023-04-14 VITALS
BODY MASS INDEX: 25.48 KG/M2 | WEIGHT: 178 LBS | SYSTOLIC BLOOD PRESSURE: 116 MMHG | HEIGHT: 70 IN | DIASTOLIC BLOOD PRESSURE: 77 MMHG | HEART RATE: 68 BPM

## 2023-04-14 PROCEDURE — 99214 OFFICE O/P EST MOD 30 MIN: CPT | Mod: 25

## 2023-04-14 PROCEDURE — 31575 DIAGNOSTIC LARYNGOSCOPY: CPT

## 2023-04-14 NOTE — HISTORY OF PRESENT ILLNESS
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 This was found on biopsy done 12/29/2020. \par Pt quite smoking 22 years ago, was smoking 1pack/day for 15 years. \par Completed CRT 4/12/21.  PET done on July 19, 2021. Last CT neck and chest on 2/7/23.  Pt still has dry mouth and lack of taste. \par R tonsil biopsy on 11/11/22 - negative for cancer.  \par \par Complete review of systems which was performed during a previous encounter was reviewed with the patient and there are no changes except as stated in the HPI section.\par \par \par \par

## 2023-04-14 NOTE — CONSULT LETTER
[Dear  ___] : Dear  [unfilled], [Consult Letter:] : I had the pleasure of evaluating your patient, [unfilled]. [Please see my note below.] : Please see my note below. [Consult Closing:] : Thank you very much for allowing me to participate in the care of this patient.  If you have any questions, please do not hesitate to contact me. [Sincerely,] : Sincerely, [FreeTextEntry2] : Dr Yassine Zazueta [FreeTextEntry3] : \par Michael De La Cruz MD, FACS\par \par Otolaryngology-Head and Neck Surgery\par [employer] [facility]\par

## 2023-05-02 ENCOUNTER — OUTPATIENT (OUTPATIENT)
Dept: OUTPATIENT SERVICES | Facility: HOSPITAL | Age: 58
LOS: 1 days | Discharge: HOME | End: 2023-05-02

## 2023-05-02 DIAGNOSIS — C09.9 MALIGNANT NEOPLASM OF TONSIL, UNSPECIFIED: ICD-10-CM

## 2023-05-09 ENCOUNTER — APPOINTMENT (OUTPATIENT)
Dept: HEMATOLOGY ONCOLOGY | Facility: CLINIC | Age: 58
End: 2023-05-09
Payer: COMMERCIAL

## 2023-05-09 PROCEDURE — 99443: CPT
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
## 2023-05-12 NOTE — REVIEW OF SYSTEMS
[Mucosal Pain] : mucosal pain [Negative] : Allergic/Immunologic [Dysphagia] : dysphagia [Depression] : depression [FreeTextEntry4] : otalgia [de-identified] : improving

## 2023-06-09 ENCOUNTER — NON-APPOINTMENT (OUTPATIENT)
Age: 58
End: 2023-06-09

## 2023-06-09 ENCOUNTER — APPOINTMENT (OUTPATIENT)
Dept: CARDIOLOGY | Facility: CLINIC | Age: 58
End: 2023-06-09
Payer: COMMERCIAL

## 2023-06-09 VITALS
DIASTOLIC BLOOD PRESSURE: 70 MMHG | WEIGHT: 176 LBS | HEIGHT: 70 IN | HEART RATE: 68 BPM | SYSTOLIC BLOOD PRESSURE: 120 MMHG | BODY MASS INDEX: 25.2 KG/M2 | RESPIRATION RATE: 16 BRPM

## 2023-06-09 PROCEDURE — 99214 OFFICE O/P EST MOD 30 MIN: CPT | Mod: 25

## 2023-06-09 PROCEDURE — 93000 ELECTROCARDIOGRAM COMPLETE: CPT

## 2023-06-09 NOTE — REASON FOR VISIT
[Carotid, Aortic and Peripheral Vascular Disease] : carotid, aortic and peripheral vascular disease [FreeTextEntry3] : BIBIANA Snider [FreeTextEntry1] : The patient is a 58-year-old white male who returns to the office for follow-up evaluation for cardiac testing.  He has a history of mild enlarged ascending aorta/aortic root noted on a recent CT scan at 4.0 cm.  (Echocardiogram from December 2022 showed 3.9 cm)\par \par The patient has otherwise no prior significant cardiac history; \par \par However he has been treated with radiation and chemotherapy for throat cancer in 2021--and appears to have recuperated nicely with follow-up checkups reported "stable";\par \par He denies chest pain, shortness of breath, palpitations or dizziness;

## 2023-06-09 NOTE — ASSESSMENT
[FreeTextEntry1] : EKG shows normal sinus rhythm at a rate of 68.  It appears within normal limits;\par \par In summary this 58-year-old gentleman has a history of mild dilatation of the aortic root with stable recent cardiac testing including negative or normal cardiac stress test.;\par He has been asymptomatic from the cardiac standpoint;\par \par \par \par Plan:\par \par Patient reassured;\par \par No additional cardiac work-up indicated at this time;\par \par No cardiac contraindication to performing moderate or unrestricted exercise regimen;\par \par Return to office within 6 months or as needed;\par \par Regular checkups and laboratory blood test with primary care, oncology and otolaryngology encouraged;

## 2023-06-09 NOTE — PHYSICAL EXAM
[Well Developed] : well developed [Well Nourished] : well nourished [No Acute Distress] : no acute distress [Normal Conjunctiva] : normal conjunctiva [Normal Venous Pressure] : normal venous pressure [No Carotid Bruit] : no carotid bruit [Normal S1, S2] : normal S1, S2 [No Murmur] : no murmur [No Rub] : no rub [No Gallop] : no gallop [Clear Lung Fields] : clear lung fields [Good Air Entry] : good air entry [No Respiratory Distress] : no respiratory distress  [Soft] : abdomen soft [Non Tender] : non-tender [No Masses/organomegaly] : no masses/organomegaly [Normal Bowel Sounds] : normal bowel sounds [Normal Gait] : normal gait [No Edema] : no edema [No Cyanosis] : no cyanosis [No Clubbing] : no clubbing [No Varicosities] : no varicosities [No Rash] : no rash [No Skin Lesions] : no skin lesions [Moves all extremities] : moves all extremities [No Focal Deficits] : no focal deficits [Normal Speech] : normal speech [Alert and Oriented] : alert and oriented [Normal memory] : normal memory [de-identified] : Hearing aid noted in left ear;

## 2023-07-07 NOTE — HISTORY OF PRESENT ILLNESS
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 This was found on biopsy done 12/29/2020. \par Pt quite smoking 22 years ago, was smoking 1pack/day for 15 years. \par Completed CRT 4/12/21.  PET done on July 19, 2021. Pt followed by Dr. Muñoz for lymphedema and occasional dysphagia and R neck soreness. CT neck 6/7/22 was stable.  CT chest on 6/7/22 showed Several new bilateral lower lobe pulmonary nodular opacities measuring up to 0.5 cm; indeterminate, but may be infectious/inflammatory in etiology.\par Pt scheduled for repeat Chest CT on 8/26/22.   Pt denies dysphonia, dysphagia, pain, SOB, otalgia.\par Complete review of systems which was performed during a previous encounter was reviewed with the patient and there are no changes except as stated in the HPI section.\par \par \par \par  No

## 2023-07-14 RX ORDER — ESCITALOPRAM OXALATE 10 MG/1
10 TABLET ORAL DAILY
Qty: 90 | Refills: 1 | Status: ACTIVE | COMMUNITY
Start: 2023-02-15 | End: 1900-01-01

## 2023-08-08 ENCOUNTER — OUTPATIENT (OUTPATIENT)
Dept: OUTPATIENT SERVICES | Facility: HOSPITAL | Age: 58
LOS: 1 days | Discharge: HOME | End: 2023-08-08

## 2023-08-08 DIAGNOSIS — C09.9 MALIGNANT NEOPLASM OF TONSIL, UNSPECIFIED: ICD-10-CM

## 2023-08-11 ENCOUNTER — OUTPATIENT (OUTPATIENT)
Dept: OUTPATIENT SERVICES | Facility: HOSPITAL | Age: 58
LOS: 1 days | End: 2023-08-11

## 2023-08-11 ENCOUNTER — APPOINTMENT (OUTPATIENT)
Dept: OTOLARYNGOLOGY | Facility: CLINIC | Age: 58
End: 2023-08-11

## 2023-08-11 ENCOUNTER — APPOINTMENT (OUTPATIENT)
Dept: CT IMAGING | Facility: CLINIC | Age: 58
End: 2023-08-11
Payer: COMMERCIAL

## 2023-08-11 DIAGNOSIS — C09.9 MALIGNANT NEOPLASM OF TONSIL, UNSPECIFIED: ICD-10-CM

## 2023-08-11 PROCEDURE — 71260 CT THORAX DX C+: CPT | Mod: 26

## 2023-08-11 PROCEDURE — 70491 CT SOFT TISSUE NECK W/DYE: CPT | Mod: 26

## 2023-08-16 ENCOUNTER — RESULT REVIEW (OUTPATIENT)
Age: 58
End: 2023-08-16

## 2023-08-16 ENCOUNTER — APPOINTMENT (OUTPATIENT)
Dept: HEMATOLOGY ONCOLOGY | Facility: CLINIC | Age: 58
End: 2023-08-16
Payer: COMMERCIAL

## 2023-08-16 VITALS
OXYGEN SATURATION: 98 % | RESPIRATION RATE: 18 BRPM | DIASTOLIC BLOOD PRESSURE: 73 MMHG | SYSTOLIC BLOOD PRESSURE: 118 MMHG | WEIGHT: 183.5 LBS | BODY MASS INDEX: 26.33 KG/M2 | HEART RATE: 71 BPM | TEMPERATURE: 98.3 F

## 2023-08-16 LAB
ALBUMIN SERPL ELPH-MCNC: 4.7 G/DL — SIGNIFICANT CHANGE UP (ref 3.3–5)
ALP SERPL-CCNC: 68 U/L — SIGNIFICANT CHANGE UP (ref 40–120)
ALT FLD-CCNC: 42 U/L — SIGNIFICANT CHANGE UP (ref 10–45)
ANION GAP SERPL CALC-SCNC: 13 MMOL/L — SIGNIFICANT CHANGE UP (ref 5–17)
AST SERPL-CCNC: 33 U/L — SIGNIFICANT CHANGE UP (ref 10–40)
BASOPHILS # BLD AUTO: 0.04 K/UL — SIGNIFICANT CHANGE UP (ref 0–0.2)
BASOPHILS NFR BLD AUTO: 0.8 % — SIGNIFICANT CHANGE UP (ref 0–2)
BILIRUB SERPL-MCNC: 0.8 MG/DL — SIGNIFICANT CHANGE UP (ref 0.2–1.2)
BUN SERPL-MCNC: 18 MG/DL — SIGNIFICANT CHANGE UP (ref 7–23)
CALCIUM SERPL-MCNC: 10 MG/DL — SIGNIFICANT CHANGE UP (ref 8.4–10.5)
CHLORIDE SERPL-SCNC: 103 MMOL/L — SIGNIFICANT CHANGE UP (ref 96–108)
CO2 SERPL-SCNC: 27 MMOL/L — SIGNIFICANT CHANGE UP (ref 22–31)
CREAT SERPL-MCNC: 1.05 MG/DL — SIGNIFICANT CHANGE UP (ref 0.5–1.3)
EGFR: 82 ML/MIN/1.73M2 — SIGNIFICANT CHANGE UP
EOSINOPHIL # BLD AUTO: 0.16 K/UL — SIGNIFICANT CHANGE UP (ref 0–0.5)
EOSINOPHIL NFR BLD AUTO: 3.2 % — SIGNIFICANT CHANGE UP (ref 0–6)
GLUCOSE SERPL-MCNC: 110 MG/DL — HIGH (ref 70–99)
HCT VFR BLD CALC: 43.2 % — SIGNIFICANT CHANGE UP (ref 39–50)
HGB BLD-MCNC: 14.7 G/DL — SIGNIFICANT CHANGE UP (ref 13–17)
IMM GRANULOCYTES NFR BLD AUTO: 0.2 % — SIGNIFICANT CHANGE UP (ref 0–0.9)
LYMPHOCYTES # BLD AUTO: 0.96 K/UL — LOW (ref 1–3.3)
LYMPHOCYTES # BLD AUTO: 19.3 % — SIGNIFICANT CHANGE UP (ref 13–44)
MAGNESIUM SERPL-MCNC: 2.3 MG/DL — SIGNIFICANT CHANGE UP (ref 1.6–2.6)
MCHC RBC-ENTMCNC: 29.2 PG — SIGNIFICANT CHANGE UP (ref 27–34)
MCHC RBC-ENTMCNC: 34 GM/DL — SIGNIFICANT CHANGE UP (ref 32–36)
MCV RBC AUTO: 85.9 FL — SIGNIFICANT CHANGE UP (ref 80–100)
MONOCYTES # BLD AUTO: 0.41 K/UL — SIGNIFICANT CHANGE UP (ref 0–0.9)
MONOCYTES NFR BLD AUTO: 8.2 % — SIGNIFICANT CHANGE UP (ref 2–14)
NEUTROPHILS # BLD AUTO: 3.39 K/UL — SIGNIFICANT CHANGE UP (ref 1.8–7.4)
NEUTROPHILS NFR BLD AUTO: 68.3 % — SIGNIFICANT CHANGE UP (ref 43–77)
NRBC # BLD: 0 /100 WBCS — SIGNIFICANT CHANGE UP (ref 0–0)
PLATELET # BLD AUTO: 198 K/UL — SIGNIFICANT CHANGE UP (ref 150–400)
POTASSIUM SERPL-MCNC: 4.2 MMOL/L — SIGNIFICANT CHANGE UP (ref 3.5–5.3)
POTASSIUM SERPL-SCNC: 4.2 MMOL/L — SIGNIFICANT CHANGE UP (ref 3.5–5.3)
PROT SERPL-MCNC: 7.3 G/DL — SIGNIFICANT CHANGE UP (ref 6–8.3)
RBC # BLD: 5.03 M/UL — SIGNIFICANT CHANGE UP (ref 4.2–5.8)
RBC # FLD: 13.6 % — SIGNIFICANT CHANGE UP (ref 10.3–14.5)
SODIUM SERPL-SCNC: 143 MMOL/L — SIGNIFICANT CHANGE UP (ref 135–145)
T3FREE SERPL-MCNC: 2.73 PG/ML — SIGNIFICANT CHANGE UP (ref 2–4.4)
T4 FREE SERPL-MCNC: 0.8 NG/DL — LOW (ref 0.9–1.8)
TSH SERPL-MCNC: 6.57 UIU/ML — HIGH (ref 0.27–4.2)
WBC # BLD: 4.97 K/UL — SIGNIFICANT CHANGE UP (ref 3.8–10.5)
WBC # FLD AUTO: 4.97 K/UL — SIGNIFICANT CHANGE UP (ref 3.8–10.5)

## 2023-08-16 PROCEDURE — 99215 OFFICE O/P EST HI 40 MIN: CPT

## 2023-08-16 NOTE — RESULTS/DATA
[FreeTextEntry1] : 8/11/23 CT Neck: No evidence of residual or recurrent neoplasm within the aerodigestive tract. No new or enlarging cervical lymph nodes. 8/11/23 CT Chest: No metastatic disease.  2/7/23 CT Neck: Evaluation of the oropharynx is nondiagnostic due to motion/swallowing artifact. Consider repeat exam if clinically warranted. No enlarged cervical lymph nodes. 2/7/23 CT Chest: Couple of stable sub-3 mm lung nodules since 6/7/2022. No new or enlarging lung nodule. Dilated aortic root, unchanged.  8/8/22 CT Chest: Resolution of ill-defined previously new lower lobe nodules. Stable sub-3 mm lung nodules. No new or enlarging lung nodule. Dilated aortic root, unchanged.  6/7/22 CT Neck: Stable exam. No evidence of recurrent or metastatic disease. 6/7/22 CT Chest: Several new bilateral lower lobe pulmonary nodular opacities measuring up to 0.5 cm; indeterminate, but may be infectious/inflammatory in etiology. Recommend CT chest follow-up in 3 months to assess for stability/clearing.  11/20/21 CT Neck: No evidence of recurrent or metastatic disease.  7/19/2021 PET: 1. Interval resolution of FDG avid right tonsillar mass. 2. Interval resolution of minimally FDG avid bilateral cervical lymph nodes. 3. Nonspecific new mild hypermetabolism in the distal esophagus without CT correlate, physiologic versus inflammatory. Please correlate clinically or with endoscopy as indicated. 4. Mild hypermetabolism mapping to a segment of small bowel in the right lower quadrant, also seen on prior study and not significantly changed, probably physiologic. Please correlate clinically.  12/29/20 Pathology: HPV-related SCC.  LVI and PNI not identified   12/18/20 CT Neck: masslike enlargement of the right palatine tonsil measuring 2.6 x 2.6 x 2.3 cm with partial effacement of the right parapharyngeal space and protrusion of the right tonsil into the oropharynx.  Increased number of B/L cervical LNs measuring less than 1 cm.

## 2023-08-16 NOTE — HISTORY OF PRESENT ILLNESS
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  Patient c/o a sore throat and right otalgia since 11/20 with no relief from antibiotics.  He saw ENT, Dr. Yassine Zazueta, and also c/o dysphagia.  Saw ENT following no improvement in Nov after antibiotics and had trial second antibiotics.   CT neck showed masslike enlargement of the right palatine tonsil measuring 2.6 x 2.6 x 2.3 cm with partial effacement of the right parapharyngeal space and protrusion of the right tonsil into the oropharynx.  Increased number of B/L cervical LNs measuring less than 1 cm.  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  LVI and PNI not identified.  He saw Dr. Michael De La Cruz where a physical exam showed a 3 cm R tonsil mass which has a central ulceration, probably from the biopsy. Lesion extends submucosally to the uvula and soft palate and laterally to the medial aspect of the retromolar trigone. No extension into the BOT.  Staging PET pending. [de-identified] : Patient presents s/p 6 weeks of  CRT with weekly Cisplatin ended on 4/12/21 for SCC of the right tonsil.  No odynophagia, severe recently. No dysphagia.  No thick excessive oral secretions.  mild xerostomia. No mucositis. + chronic tinnitus, slightly worse - began 9/11. + R otalgia less severe. No fevers, mild cough, no SOB.    He has been taking laxapro 10 mg as prescribed and feels its no longer working, he would like to increase to 15 mg daily. 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 He is enjoying hearing new sounds. He continues with difficultly swallowing, he increases his fluids with meals, to help. He continues performing S & S massage and other techniques as he learned in therapy.

## 2023-08-16 NOTE — REVIEW OF SYSTEMS
[Dysphagia] : dysphagia [Mucosal Pain] : mucosal pain [Depression] : depression [Negative] : Allergic/Immunologic [FreeTextEntry4] : otalgia [de-identified] : improving

## 2023-09-08 ENCOUNTER — NON-APPOINTMENT (OUTPATIENT)
Age: 58
End: 2023-09-08

## 2023-09-18 ENCOUNTER — APPOINTMENT (OUTPATIENT)
Dept: NEUROLOGY | Facility: CLINIC | Age: 58
End: 2023-09-18
Payer: COMMERCIAL

## 2023-09-18 VITALS — BODY MASS INDEX: 25.48 KG/M2 | WEIGHT: 178 LBS | HEIGHT: 70 IN

## 2023-09-18 DIAGNOSIS — R41.3 OTHER AMNESIA: ICD-10-CM

## 2023-09-18 DIAGNOSIS — G31.84 MILD COGNITIVE IMPAIRMENT, SO STATED: ICD-10-CM

## 2023-09-18 PROCEDURE — 99214 OFFICE O/P EST MOD 30 MIN: CPT

## 2023-12-08 ENCOUNTER — APPOINTMENT (OUTPATIENT)
Dept: CARDIOLOGY | Facility: CLINIC | Age: 58
End: 2023-12-08
Payer: COMMERCIAL

## 2023-12-08 ENCOUNTER — NON-APPOINTMENT (OUTPATIENT)
Age: 58
End: 2023-12-08

## 2023-12-08 VITALS
HEIGHT: 70 IN | RESPIRATION RATE: 16 BRPM | WEIGHT: 187 LBS | HEART RATE: 73 BPM | DIASTOLIC BLOOD PRESSURE: 70 MMHG | BODY MASS INDEX: 26.77 KG/M2 | SYSTOLIC BLOOD PRESSURE: 110 MMHG

## 2023-12-08 DIAGNOSIS — I77.810 THORACIC AORTIC ECTASIA: ICD-10-CM

## 2023-12-08 PROCEDURE — 99214 OFFICE O/P EST MOD 30 MIN: CPT | Mod: 25

## 2023-12-08 PROCEDURE — 93000 ELECTROCARDIOGRAM COMPLETE: CPT

## 2024-01-27 ENCOUNTER — APPOINTMENT (OUTPATIENT)
Dept: CT IMAGING | Facility: CLINIC | Age: 59
End: 2024-01-27

## 2024-01-27 ENCOUNTER — OUTPATIENT (OUTPATIENT)
Dept: OUTPATIENT SERVICES | Facility: HOSPITAL | Age: 59
LOS: 1 days | End: 2024-01-27
Payer: COMMERCIAL

## 2024-01-27 DIAGNOSIS — C09.9 MALIGNANT NEOPLASM OF TONSIL, UNSPECIFIED: ICD-10-CM

## 2024-01-27 PROCEDURE — 70491 CT SOFT TISSUE NECK W/DYE: CPT | Mod: 26

## 2024-02-19 ENCOUNTER — OUTPATIENT (OUTPATIENT)
Dept: OUTPATIENT SERVICES | Facility: HOSPITAL | Age: 59
LOS: 1 days | Discharge: HOME | End: 2024-02-19

## 2024-02-19 DIAGNOSIS — C09.9 MALIGNANT NEOPLASM OF TONSIL, UNSPECIFIED: ICD-10-CM

## 2024-02-26 ENCOUNTER — RESULT REVIEW (OUTPATIENT)
Age: 59
End: 2024-02-26

## 2024-02-26 ENCOUNTER — APPOINTMENT (OUTPATIENT)
Dept: HEMATOLOGY ONCOLOGY | Facility: CLINIC | Age: 59
End: 2024-02-26
Payer: COMMERCIAL

## 2024-02-26 VITALS
RESPIRATION RATE: 17 BRPM | SYSTOLIC BLOOD PRESSURE: 137 MMHG | WEIGHT: 193.25 LBS | HEART RATE: 76 BPM | HEIGHT: 70 IN | OXYGEN SATURATION: 98 % | TEMPERATURE: 98.4 F | DIASTOLIC BLOOD PRESSURE: 79 MMHG | BODY MASS INDEX: 27.67 KG/M2

## 2024-02-26 DIAGNOSIS — K25.9 GASTRIC ULCER, UNSPECIFIED AS ACUTE OR CHRONIC, W/OUT HEMORRHAGE OR PERFORATION: ICD-10-CM

## 2024-02-26 DIAGNOSIS — G62.9 POLYNEUROPATHY, UNSPECIFIED: ICD-10-CM

## 2024-02-26 DIAGNOSIS — F41.9 ANXIETY DISORDER, UNSPECIFIED: ICD-10-CM

## 2024-02-26 DIAGNOSIS — H91.93 UNSPECIFIED HEARING LOSS, BILATERAL: ICD-10-CM

## 2024-02-26 LAB
ALBUMIN SERPL ELPH-MCNC: 4.9 G/DL — SIGNIFICANT CHANGE UP (ref 3.3–5)
ALP SERPL-CCNC: 68 U/L — SIGNIFICANT CHANGE UP (ref 40–120)
ALT FLD-CCNC: 30 U/L — SIGNIFICANT CHANGE UP (ref 10–45)
ANION GAP SERPL CALC-SCNC: 12 MMOL/L — SIGNIFICANT CHANGE UP (ref 5–17)
AST SERPL-CCNC: 30 U/L — SIGNIFICANT CHANGE UP (ref 10–40)
BASOPHILS # BLD AUTO: 0.05 K/UL — SIGNIFICANT CHANGE UP (ref 0–0.2)
BASOPHILS NFR BLD AUTO: 0.9 % — SIGNIFICANT CHANGE UP (ref 0–2)
BILIRUB SERPL-MCNC: 0.8 MG/DL — SIGNIFICANT CHANGE UP (ref 0.2–1.2)
BUN SERPL-MCNC: 21 MG/DL — SIGNIFICANT CHANGE UP (ref 7–23)
CALCIUM SERPL-MCNC: 10.4 MG/DL — SIGNIFICANT CHANGE UP (ref 8.4–10.5)
CHLORIDE SERPL-SCNC: 103 MMOL/L — SIGNIFICANT CHANGE UP (ref 96–108)
CO2 SERPL-SCNC: 27 MMOL/L — SIGNIFICANT CHANGE UP (ref 22–31)
CREAT SERPL-MCNC: 1.08 MG/DL — SIGNIFICANT CHANGE UP (ref 0.5–1.3)
EGFR: 80 ML/MIN/1.73M2 — SIGNIFICANT CHANGE UP
EOSINOPHIL # BLD AUTO: 0.17 K/UL — SIGNIFICANT CHANGE UP (ref 0–0.5)
EOSINOPHIL NFR BLD AUTO: 3 % — SIGNIFICANT CHANGE UP (ref 0–6)
GLUCOSE SERPL-MCNC: 91 MG/DL — SIGNIFICANT CHANGE UP (ref 70–99)
HCT VFR BLD CALC: 44.5 % — SIGNIFICANT CHANGE UP (ref 39–50)
HGB BLD-MCNC: 15.2 G/DL — SIGNIFICANT CHANGE UP (ref 13–17)
IMM GRANULOCYTES NFR BLD AUTO: 0.2 % — SIGNIFICANT CHANGE UP (ref 0–0.9)
LYMPHOCYTES # BLD AUTO: 1.05 K/UL — SIGNIFICANT CHANGE UP (ref 1–3.3)
LYMPHOCYTES # BLD AUTO: 18.3 % — SIGNIFICANT CHANGE UP (ref 13–44)
MAGNESIUM SERPL-MCNC: 2.3 MG/DL — SIGNIFICANT CHANGE UP (ref 1.6–2.6)
MCHC RBC-ENTMCNC: 29.2 PG — SIGNIFICANT CHANGE UP (ref 27–34)
MCHC RBC-ENTMCNC: 34.2 GM/DL — SIGNIFICANT CHANGE UP (ref 32–36)
MCV RBC AUTO: 85.6 FL — SIGNIFICANT CHANGE UP (ref 80–100)
MONOCYTES # BLD AUTO: 0.59 K/UL — SIGNIFICANT CHANGE UP (ref 0–0.9)
MONOCYTES NFR BLD AUTO: 10.3 % — SIGNIFICANT CHANGE UP (ref 2–14)
NEUTROPHILS # BLD AUTO: 3.88 K/UL — SIGNIFICANT CHANGE UP (ref 1.8–7.4)
NEUTROPHILS NFR BLD AUTO: 67.3 % — SIGNIFICANT CHANGE UP (ref 43–77)
NRBC # BLD: 0 /100 WBCS — SIGNIFICANT CHANGE UP (ref 0–0)
PLATELET # BLD AUTO: 194 K/UL — SIGNIFICANT CHANGE UP (ref 150–400)
POTASSIUM SERPL-MCNC: 4.4 MMOL/L — SIGNIFICANT CHANGE UP (ref 3.5–5.3)
POTASSIUM SERPL-SCNC: 4.4 MMOL/L — SIGNIFICANT CHANGE UP (ref 3.5–5.3)
PROT SERPL-MCNC: 7.6 G/DL — SIGNIFICANT CHANGE UP (ref 6–8.3)
RBC # BLD: 5.2 M/UL — SIGNIFICANT CHANGE UP (ref 4.2–5.8)
RBC # FLD: 13.2 % — SIGNIFICANT CHANGE UP (ref 10.3–14.5)
SODIUM SERPL-SCNC: 142 MMOL/L — SIGNIFICANT CHANGE UP (ref 135–145)
T3FREE SERPL-MCNC: 3.43 PG/ML — SIGNIFICANT CHANGE UP (ref 2–4.4)
T4 FREE SERPL-MCNC: 0.9 NG/DL — SIGNIFICANT CHANGE UP (ref 0.9–1.8)
TSH SERPL-MCNC: 4.94 UIU/ML — HIGH (ref 0.27–4.2)
URATE SERPL-MCNC: 6.9 MG/DL — SIGNIFICANT CHANGE UP (ref 3.4–8.8)
WBC # BLD: 5.75 K/UL — SIGNIFICANT CHANGE UP (ref 3.8–10.5)
WBC # FLD AUTO: 5.75 K/UL — SIGNIFICANT CHANGE UP (ref 3.8–10.5)

## 2024-02-26 PROCEDURE — G2211 COMPLEX E/M VISIT ADD ON: CPT

## 2024-02-26 PROCEDURE — 99215 OFFICE O/P EST HI 40 MIN: CPT

## 2024-02-26 NOTE — HISTORY OF PRESENT ILLNESS
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  Patient c/o a sore throat and right otalgia since 11/20 with no relief from antibiotics.  He saw ENT, Dr. Yassine Zazueta, and also c/o dysphagia.  Saw ENT following no improvement in Nov after antibiotics and had trial second antibiotics.   CT neck showed masslike enlargement of the right palatine tonsil measuring 2.6 x 2.6 x 2.3 cm with partial effacement of the right parapharyngeal space and protrusion of the right tonsil into the oropharynx.  Increased number of B/L cervical LNs measuring less than 1 cm.  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  LVI and PNI not identified.  He saw Dr. Michael De La Cruz where a physical exam showed a 3 cm R tonsil mass which has a central ulceration, probably from the biopsy. Lesion extends submucosally to the uvula and soft palate and laterally to the medial aspect of the retromolar trigone. No extension into the BOT.  Staging PET pending. [de-identified] : Patient presents s/p 6 weeks of  CRT with weekly Cisplatin ended on 4/12/21 for SCC of the right tonsil.  No odynophagia, severe recently. No dysphagia.  No thick excessive oral secretions.  mild xerostomia. No mucositis. + chronic tinnitus, slightly worse - began 9/11. + R otalgia less severe. No fevers, mild cough, no SOB.    He has been taking laxapro 10 mg as prescribed and feels its no longer working, he would like to increase to 15 mg daily. 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 He is enjoying hearing new sounds. He continues with difficultly swallowing, he increases his fluids with meals, to help. He continues performing S & S massage and other techniques as he learned in therapy.

## 2024-02-26 NOTE — RESULTS/DATA
[FreeTextEntry1] : 1/27/24 CT Neck: Stable interval follow-up CT examination without evidence of neoplastic disease progression nor recurrence. No new or enlarging cervical lymph nodes.  8/11/23 CT Neck: No evidence of residual or recurrent neoplasm within the aerodigestive tract. No new or enlarging cervical lymph nodes. 8/11/23 CT Chest: No metastatic disease.  2/7/23 CT Neck: Evaluation of the oropharynx is nondiagnostic due to motion/swallowing artifact. Consider repeat exam if clinically warranted. No enlarged cervical lymph nodes. 2/7/23 CT Chest: Couple of stable sub-3 mm lung nodules since 6/7/2022. No new or enlarging lung nodule. Dilated aortic root, unchanged.  8/8/22 CT Chest: Resolution of ill-defined previously new lower lobe nodules. Stable sub-3 mm lung nodules. No new or enlarging lung nodule. Dilated aortic root, unchanged.  6/7/22 CT Neck: Stable exam. No evidence of recurrent or metastatic disease. 6/7/22 CT Chest: Several new bilateral lower lobe pulmonary nodular opacities measuring up to 0.5 cm; indeterminate, but may be infectious/inflammatory in etiology. Recommend CT chest follow-up in 3 months to assess for stability/clearing.  11/20/21 CT Neck: No evidence of recurrent or metastatic disease.  7/19/2021 PET: 1. Interval resolution of FDG avid right tonsillar mass. 2. Interval resolution of minimally FDG avid bilateral cervical lymph nodes. 3. Nonspecific new mild hypermetabolism in the distal esophagus without CT correlate, physiologic versus inflammatory. Please correlate clinically or with endoscopy as indicated. 4. Mild hypermetabolism mapping to a segment of small bowel in the right lower quadrant, also seen on prior study and not significantly changed, probably physiologic. Please correlate clinically.  12/29/20 Pathology: HPV-related SCC.  LVI and PNI not identified   12/18/20 CT Neck: masslike enlargement of the right palatine tonsil measuring 2.6 x 2.6 x 2.3 cm with partial effacement of the right parapharyngeal space and protrusion of the right tonsil into the oropharynx.  Increased number of B/L cervical LNs measuring less than 1 cm.

## 2024-02-27 DIAGNOSIS — H91.93 UNSPECIFIED HEARING LOSS, BILATERAL: ICD-10-CM

## 2024-02-27 DIAGNOSIS — F41.9 ANXIETY DISORDER, UNSPECIFIED: ICD-10-CM

## 2024-02-27 DIAGNOSIS — G62.9 POLYNEUROPATHY, UNSPECIFIED: ICD-10-CM

## 2024-02-27 DIAGNOSIS — K25.9 GASTRIC ULCER, UNSPECIFIED AS ACUTE OR CHRONIC, WITHOUT HEMORRHAGE OR PERFORATION: ICD-10-CM

## 2024-03-12 ENCOUNTER — APPOINTMENT (OUTPATIENT)
Dept: OTOLARYNGOLOGY | Facility: CLINIC | Age: 59
End: 2024-03-12
Payer: COMMERCIAL

## 2024-03-12 VITALS
SYSTOLIC BLOOD PRESSURE: 121 MMHG | HEART RATE: 79 BPM | BODY MASS INDEX: 27.63 KG/M2 | WEIGHT: 193 LBS | HEIGHT: 70 IN | DIASTOLIC BLOOD PRESSURE: 78 MMHG

## 2024-03-12 DIAGNOSIS — C09.9 MALIGNANT NEOPLASM OF TONSIL, UNSPECIFIED: ICD-10-CM

## 2024-03-12 PROCEDURE — 31575 DIAGNOSTIC LARYNGOSCOPY: CPT

## 2024-03-12 PROCEDURE — 99213 OFFICE O/P EST LOW 20 MIN: CPT | Mod: 25

## 2024-03-12 NOTE — CONSULT LETTER
[Dear  ___] : Dear  [unfilled], [Consult Letter:] : I had the pleasure of evaluating your patient, [unfilled]. [Please see my note below.] : Please see my note below. [Consult Closing:] : Thank you very much for allowing me to participate in the care of this patient.  If you have any questions, please do not hesitate to contact me. [Sincerely,] : Sincerely, [FreeTextEntry2] : Dr Yassine Zazueta [FreeTextEntry3] : \par  Michael De La Cruz MD, FACS\par  \par  Otolaryngology-Head and Neck Surgery\par  [employer] [facility]\par

## 2024-03-12 NOTE — HISTORY OF PRESENT ILLNESS
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 This was found on biopsy done 12/29/2020.  Pt quite smoking 22 years ago, was smoking 1pack/day for 15 years.  Completed CRT 4/12/21.   R tonsil biopsy on 11/11/22 - negative for cancer.   Last CT neck done 1/27/2024.   Pt c/o intermittent soreness if R side of neck. He does exercises/manual massage that he learned from SLP which helps.  Pt still has dry mouth and lack of taste. Has some difficulty swallowing dry fods like bagels.  Pt denies new oral lesions, dysphagia, dyspnea, or  dysphonia. No fever, chills, or weight loss. Gaining weight.  Complete review of systems which was performed during a previous encounter was reviewed with the patient and there are no changes except as stated in the HPI section.  CT neck 1/27/2024: IMPRESSION: Stable interval follow-up CT examination without evidence of neoplastic disease progression nor recurrence. No new or enlarging cervical lymph nodes.

## 2024-06-07 ENCOUNTER — NON-APPOINTMENT (OUTPATIENT)
Age: 59
End: 2024-06-07

## 2024-06-07 ENCOUNTER — APPOINTMENT (OUTPATIENT)
Dept: CARDIOLOGY | Facility: CLINIC | Age: 59
End: 2024-06-07
Payer: COMMERCIAL

## 2024-06-07 VITALS
SYSTOLIC BLOOD PRESSURE: 106 MMHG | RESPIRATION RATE: 16 BRPM | HEIGHT: 70 IN | BODY MASS INDEX: 26.77 KG/M2 | DIASTOLIC BLOOD PRESSURE: 62 MMHG | HEART RATE: 66 BPM | WEIGHT: 187 LBS

## 2024-06-07 DIAGNOSIS — I77.810 THORACIC AORTIC ECTASIA: ICD-10-CM

## 2024-06-07 DIAGNOSIS — R94.31 ABNORMAL ELECTROCARDIOGRAM [ECG] [EKG]: ICD-10-CM

## 2024-06-07 PROCEDURE — 93000 ELECTROCARDIOGRAM COMPLETE: CPT

## 2024-06-07 PROCEDURE — G2211 COMPLEX E/M VISIT ADD ON: CPT | Mod: NC,1L

## 2024-06-07 PROCEDURE — 99214 OFFICE O/P EST MOD 30 MIN: CPT

## 2024-06-07 NOTE — REASON FOR VISIT
[Carotid, Aortic and Peripheral Vascular Disease] : carotid, aortic and peripheral vascular disease [FreeTextEntry3] : BIBIANA Snider [FreeTextEntry1] : The patient is a 59-year-old white male who returns to the office for follow-up evaluation for cardiac testing.    He has a history of mild enlarged ascending aorta/aortic root noted on a recent CT scan at 4.0 cm.  (Echocardiogram from December 2022 showed 3.9 cm)  The patient has otherwise no prior significant cardiac history;   He has also been treated with radiation and chemotherapy for throat cancer in 2021--and appears to have recuperated nicely with follow-up checkups reported "stable";  He denies chest pain, shortness of breath, palpitations or dizziness;

## 2024-06-07 NOTE — ASSESSMENT
[FreeTextEntry1] : EKG shows normal sinus rhythm at a rate of 66.  There is borderline low voltage noted but otherwise no acute changes;  In summary, this 59-year-old gentleman has a known history for mild enlarged ascending aorta on echocardiography.  He has had a known history for throat cancer and surgery and radiation in 2021; (Fortunately this has been stable on recent checkups-he reports)    He has had a stable cardiac workup in the past including a cardiac stress test 1 year ago which was negative for ischemia with excellent exercise tolerance;      Plan:   Recommend to further evaluate his ascending aortic enlargement to repeat echocardiogram prior to next visit within 5 to 6 months;  Encouraged to continue moderate physical activity and exercise;  Regular checkups and laboratory blood test with primary care encouraged including lipid profile;  Return to office within 6 months or as needed;

## 2024-06-07 NOTE — HISTORY OF PRESENT ILLNESS
[FreeTextEntry1] : He is usually physically active and bicycles many miles per week during the season;--however, just recently over the past week or 2 patient developed severe pain and swelling in his left knee and was ultimately found to have tears in the meniscus for which he is preop with Dr. Basilio Buenrostro for surgery in the near future;  this has significantly curtailed his physical activity for now;     Exercise stress test performed December 13, 2022 showed excellent exercise tolerance with 13 METS workload into stage V.  No symptoms of chest pain.  No arrhythmias were seen.  Blood pressure response was normal.  EKGs demonstrated negative for ST abnormality or ischemia-i.e. negative test;   Transthoracic echocardiogram showed preserved left ventricular size and systolic function with a EF 60 to 65%.  Mild MR and TR.  There was mild dilatation of the aortic root at 3.94 cm with normal ascending aortic dimensions.;

## 2024-06-07 NOTE — REVIEW OF SYSTEMS
[Hearing Loss] : hearing loss [Joint Pain] : joint pain [Joint Swelling] : joint swelling [Knee Problem] : knee problems [Knee Pain] : knee pain [Negative] : Heme/Lymph

## 2024-06-07 NOTE — PHYSICAL EXAM
[Well Developed] : well developed [Well Nourished] : well nourished [No Acute Distress] : no acute distress [Normal Conjunctiva] : normal conjunctiva [Normal Venous Pressure] : normal venous pressure [No Carotid Bruit] : no carotid bruit [Normal S1, S2] : normal S1, S2 [No Murmur] : no murmur [No Rub] : no rub [No Gallop] : no gallop [Clear Lung Fields] : clear lung fields [Good Air Entry] : good air entry [No Respiratory Distress] : no respiratory distress  [Soft] : abdomen soft [Non Tender] : non-tender [No Masses/organomegaly] : no masses/organomegaly [Normal Bowel Sounds] : normal bowel sounds [Normal Gait] : normal gait [No Edema] : no edema [No Cyanosis] : no cyanosis [No Clubbing] : no clubbing [No Varicosities] : no varicosities [No Rash] : no rash [No Skin Lesions] : no skin lesions [Moves all extremities] : moves all extremities [No Focal Deficits] : no focal deficits [Normal Speech] : normal speech [Alert and Oriented] : alert and oriented [Normal memory] : normal memory [de-identified] : Hearing aid noted in left ear;

## 2024-07-08 ENCOUNTER — NON-APPOINTMENT (OUTPATIENT)
Age: 59
End: 2024-07-08

## 2024-07-09 ENCOUNTER — APPOINTMENT (OUTPATIENT)
Dept: OTOLARYNGOLOGY | Facility: CLINIC | Age: 59
End: 2024-07-09
Payer: COMMERCIAL

## 2024-07-09 VITALS
WEIGHT: 187 LBS | HEART RATE: 85 BPM | HEIGHT: 70 IN | DIASTOLIC BLOOD PRESSURE: 75 MMHG | SYSTOLIC BLOOD PRESSURE: 122 MMHG | BODY MASS INDEX: 26.77 KG/M2

## 2024-07-09 DIAGNOSIS — C09.9 MALIGNANT NEOPLASM OF TONSIL, UNSPECIFIED: ICD-10-CM

## 2024-07-09 PROCEDURE — 99213 OFFICE O/P EST LOW 20 MIN: CPT | Mod: 25

## 2024-07-09 PROCEDURE — 31575 DIAGNOSTIC LARYNGOSCOPY: CPT

## 2024-08-05 ENCOUNTER — RX RENEWAL (OUTPATIENT)
Age: 59
End: 2024-08-05

## 2024-11-05 ENCOUNTER — APPOINTMENT (OUTPATIENT)
Dept: OTOLARYNGOLOGY | Facility: CLINIC | Age: 59
End: 2024-11-05
Payer: COMMERCIAL

## 2024-11-05 VITALS
BODY MASS INDEX: 27.2 KG/M2 | WEIGHT: 190 LBS | HEIGHT: 70 IN | DIASTOLIC BLOOD PRESSURE: 72 MMHG | SYSTOLIC BLOOD PRESSURE: 121 MMHG | HEART RATE: 79 BPM

## 2024-11-05 DIAGNOSIS — C09.9 MALIGNANT NEOPLASM OF TONSIL, UNSPECIFIED: ICD-10-CM

## 2024-11-05 PROCEDURE — 99212 OFFICE O/P EST SF 10 MIN: CPT | Mod: 25

## 2024-11-05 PROCEDURE — 31575 DIAGNOSTIC LARYNGOSCOPY: CPT

## 2024-11-18 ENCOUNTER — APPOINTMENT (OUTPATIENT)
Dept: CARDIOLOGY | Facility: CLINIC | Age: 59
End: 2024-11-18
Payer: COMMERCIAL

## 2024-11-18 ENCOUNTER — NON-APPOINTMENT (OUTPATIENT)
Age: 59
End: 2024-11-18

## 2024-11-18 PROCEDURE — 93306 TTE W/DOPPLER COMPLETE: CPT

## 2024-12-06 ENCOUNTER — APPOINTMENT (OUTPATIENT)
Dept: CARDIOLOGY | Facility: CLINIC | Age: 59
End: 2024-12-06
Payer: COMMERCIAL

## 2024-12-06 ENCOUNTER — NON-APPOINTMENT (OUTPATIENT)
Age: 59
End: 2024-12-06

## 2024-12-06 VITALS
WEIGHT: 186 LBS | RESPIRATION RATE: 16 BRPM | HEART RATE: 72 BPM | DIASTOLIC BLOOD PRESSURE: 66 MMHG | SYSTOLIC BLOOD PRESSURE: 101 MMHG | BODY MASS INDEX: 26.63 KG/M2 | HEIGHT: 70 IN

## 2024-12-06 DIAGNOSIS — G62.9 POLYNEUROPATHY, UNSPECIFIED: ICD-10-CM

## 2024-12-06 DIAGNOSIS — I77.810 THORACIC AORTIC ECTASIA: ICD-10-CM

## 2024-12-06 DIAGNOSIS — I89.0 LYMPHEDEMA, NOT ELSEWHERE CLASSIFIED: ICD-10-CM

## 2024-12-06 PROCEDURE — G2211 COMPLEX E/M VISIT ADD ON: CPT | Mod: NC

## 2024-12-06 PROCEDURE — 99214 OFFICE O/P EST MOD 30 MIN: CPT

## 2024-12-06 PROCEDURE — 93000 ELECTROCARDIOGRAM COMPLETE: CPT

## 2025-01-24 ENCOUNTER — APPOINTMENT (OUTPATIENT)
Dept: CT IMAGING | Facility: CLINIC | Age: 60
End: 2025-01-24
Payer: COMMERCIAL

## 2025-01-24 ENCOUNTER — OUTPATIENT (OUTPATIENT)
Dept: OUTPATIENT SERVICES | Facility: HOSPITAL | Age: 60
LOS: 1 days | End: 2025-01-24

## 2025-01-24 DIAGNOSIS — C09.9 MALIGNANT NEOPLASM OF TONSIL, UNSPECIFIED: ICD-10-CM

## 2025-01-24 PROCEDURE — 70491 CT SOFT TISSUE NECK W/DYE: CPT | Mod: 26

## 2025-01-24 PROCEDURE — 71260 CT THORAX DX C+: CPT | Mod: 26

## 2025-01-28 ENCOUNTER — OUTPATIENT (OUTPATIENT)
Dept: OUTPATIENT SERVICES | Facility: HOSPITAL | Age: 60
LOS: 1 days | Discharge: HOME | End: 2025-01-28

## 2025-01-28 DIAGNOSIS — C09.9 MALIGNANT NEOPLASM OF TONSIL, UNSPECIFIED: ICD-10-CM

## 2025-01-31 ENCOUNTER — APPOINTMENT (OUTPATIENT)
Dept: HEMATOLOGY ONCOLOGY | Facility: CLINIC | Age: 60
End: 2025-01-31
Payer: COMMERCIAL

## 2025-01-31 ENCOUNTER — RESULT REVIEW (OUTPATIENT)
Age: 60
End: 2025-01-31

## 2025-01-31 ENCOUNTER — NON-APPOINTMENT (OUTPATIENT)
Age: 60
End: 2025-01-31

## 2025-01-31 VITALS
TEMPERATURE: 98 F | HEART RATE: 69 BPM | OXYGEN SATURATION: 98 % | BODY MASS INDEX: 27.35 KG/M2 | WEIGHT: 191 LBS | HEIGHT: 70 IN | DIASTOLIC BLOOD PRESSURE: 72 MMHG | SYSTOLIC BLOOD PRESSURE: 115 MMHG

## 2025-01-31 DIAGNOSIS — F41.9 ANXIETY DISORDER, UNSPECIFIED: ICD-10-CM

## 2025-01-31 DIAGNOSIS — C09.9 MALIGNANT NEOPLASM OF TONSIL, UNSPECIFIED: ICD-10-CM

## 2025-01-31 DIAGNOSIS — G62.9 POLYNEUROPATHY, UNSPECIFIED: ICD-10-CM

## 2025-01-31 DIAGNOSIS — H91.93 UNSPECIFIED HEARING LOSS, BILATERAL: ICD-10-CM

## 2025-01-31 DIAGNOSIS — K25.9 GASTRIC ULCER, UNSPECIFIED AS ACUTE OR CHRONIC, W/OUT HEMORRHAGE OR PERFORATION: ICD-10-CM

## 2025-01-31 LAB
BASOPHILS # BLD AUTO: 0.06 K/UL — SIGNIFICANT CHANGE UP (ref 0–0.2)
BASOPHILS NFR BLD AUTO: 0.9 % — SIGNIFICANT CHANGE UP (ref 0–2)
EOSINOPHIL # BLD AUTO: 0.28 K/UL — SIGNIFICANT CHANGE UP (ref 0–0.5)
EOSINOPHIL NFR BLD AUTO: 4.2 % — SIGNIFICANT CHANGE UP (ref 0–6)
HCT VFR BLD CALC: 45.3 % — SIGNIFICANT CHANGE UP (ref 39–50)
HGB BLD-MCNC: 15.4 G/DL — SIGNIFICANT CHANGE UP (ref 13–17)
IMM GRANULOCYTES NFR BLD AUTO: 0.3 % — SIGNIFICANT CHANGE UP (ref 0–0.9)
LYMPHOCYTES # BLD AUTO: 1.18 K/UL — SIGNIFICANT CHANGE UP (ref 1–3.3)
LYMPHOCYTES # BLD AUTO: 17.8 % — SIGNIFICANT CHANGE UP (ref 13–44)
MCHC RBC-ENTMCNC: 28.9 PG — SIGNIFICANT CHANGE UP (ref 27–34)
MCHC RBC-ENTMCNC: 34 G/DL — SIGNIFICANT CHANGE UP (ref 32–36)
MCV RBC AUTO: 85 FL — SIGNIFICANT CHANGE UP (ref 80–100)
MONOCYTES # BLD AUTO: 0.55 K/UL — SIGNIFICANT CHANGE UP (ref 0–0.9)
MONOCYTES NFR BLD AUTO: 8.3 % — SIGNIFICANT CHANGE UP (ref 2–14)
NEUTROPHILS # BLD AUTO: 4.55 K/UL — SIGNIFICANT CHANGE UP (ref 1.8–7.4)
NEUTROPHILS NFR BLD AUTO: 68.5 % — SIGNIFICANT CHANGE UP (ref 43–77)
NRBC # BLD: 0 /100 WBCS — SIGNIFICANT CHANGE UP (ref 0–0)
NRBC BLD-RTO: 0 /100 WBCS — SIGNIFICANT CHANGE UP (ref 0–0)
PLATELET # BLD AUTO: 178 K/UL — SIGNIFICANT CHANGE UP (ref 150–400)
RBC # BLD: 5.33 M/UL — SIGNIFICANT CHANGE UP (ref 4.2–5.8)
RBC # FLD: 13.8 % — SIGNIFICANT CHANGE UP (ref 10.3–14.5)
WBC # BLD: 6.64 K/UL — SIGNIFICANT CHANGE UP (ref 3.8–10.5)
WBC # FLD AUTO: 6.64 K/UL — SIGNIFICANT CHANGE UP (ref 3.8–10.5)

## 2025-01-31 PROCEDURE — 99215 OFFICE O/P EST HI 40 MIN: CPT

## 2025-01-31 PROCEDURE — G2211 COMPLEX E/M VISIT ADD ON: CPT

## 2025-02-03 ENCOUNTER — RX RENEWAL (OUTPATIENT)
Age: 60
End: 2025-02-03

## 2025-02-03 DIAGNOSIS — G62.9 POLYNEUROPATHY, UNSPECIFIED: ICD-10-CM

## 2025-02-06 ENCOUNTER — NON-APPOINTMENT (OUTPATIENT)
Age: 60
End: 2025-02-06

## 2025-02-06 LAB
ALBUMIN SERPL ELPH-MCNC: 4.5 G/DL
ALP BLD-CCNC: 68 U/L
ALT SERPL-CCNC: 24 U/L
ANION GAP SERPL CALC-SCNC: 11 MMOL/L
AST SERPL-CCNC: 23 U/L
BILIRUB SERPL-MCNC: 0.6 MG/DL
BUN SERPL-MCNC: 17 MG/DL
CALCIUM SERPL-MCNC: 9.7 MG/DL
CHLORIDE SERPL-SCNC: 104 MMOL/L
CO2 SERPL-SCNC: 26 MMOL/L
CREAT SERPL-MCNC: 1.11 MG/DL
EGFR: 76 ML/MIN/1.73M2
GLUCOSE SERPL-MCNC: 90 MG/DL
MAGNESIUM SERPL-MCNC: 2.3 MG/DL
POTASSIUM SERPL-SCNC: 4.3 MMOL/L
PROT SERPL-MCNC: 7.2 G/DL
SODIUM SERPL-SCNC: 140 MMOL/L
T3FREE SERPL-MCNC: 2.84 PG/ML
T4 FREE SERPL-MCNC: 0.7 NG/DL
TSH SERPL-ACNC: 12.9 UIU/ML

## 2025-02-06 RX ORDER — LEVOTHYROXINE SODIUM 0.03 MG/1
25 TABLET ORAL
Qty: 90 | Refills: 0 | Status: ACTIVE | COMMUNITY
Start: 2025-02-06 | End: 1900-01-01

## 2025-02-14 ENCOUNTER — APPOINTMENT (OUTPATIENT)
Dept: PHYSICAL MEDICINE AND REHAB | Facility: CLINIC | Age: 60
End: 2025-02-14

## 2025-02-14 DIAGNOSIS — G62.9 POLYNEUROPATHY, UNSPECIFIED: ICD-10-CM

## 2025-02-14 DIAGNOSIS — I89.0 LYMPHEDEMA, NOT ELSEWHERE CLASSIFIED: ICD-10-CM

## 2025-02-14 DIAGNOSIS — M79.2 NEURALGIA AND NEURITIS, UNSPECIFIED: ICD-10-CM

## 2025-02-14 PROCEDURE — 99204 OFFICE O/P NEW MOD 45 MIN: CPT

## 2025-02-14 RX ORDER — GABAPENTIN 300 MG/1
300 CAPSULE ORAL 3 TIMES DAILY
Qty: 90 | Refills: 1 | Status: ACTIVE | COMMUNITY
Start: 2025-02-14 | End: 1900-01-01

## 2025-03-17 ENCOUNTER — RESULT REVIEW (OUTPATIENT)
Age: 60
End: 2025-03-17

## 2025-03-17 ENCOUNTER — APPOINTMENT (OUTPATIENT)
Dept: HEMATOLOGY ONCOLOGY | Facility: CLINIC | Age: 60
End: 2025-03-17

## 2025-03-17 LAB
BASOPHILS # BLD AUTO: 0.04 K/UL — SIGNIFICANT CHANGE UP (ref 0–0.2)
BASOPHILS NFR BLD AUTO: 0.7 % — SIGNIFICANT CHANGE UP (ref 0–2)
EOSINOPHIL # BLD AUTO: 0.19 K/UL — SIGNIFICANT CHANGE UP (ref 0–0.5)
EOSINOPHIL NFR BLD AUTO: 3.5 % — SIGNIFICANT CHANGE UP (ref 0–6)
HCT VFR BLD CALC: 42 % — SIGNIFICANT CHANGE UP (ref 39–50)
HGB BLD-MCNC: 14.3 G/DL — SIGNIFICANT CHANGE UP (ref 13–17)
IMM GRANULOCYTES NFR BLD AUTO: 0.4 % — SIGNIFICANT CHANGE UP (ref 0–0.9)
LYMPHOCYTES # BLD AUTO: 0.88 K/UL — LOW (ref 1–3.3)
LYMPHOCYTES # BLD AUTO: 16.2 % — SIGNIFICANT CHANGE UP (ref 13–44)
MCHC RBC-ENTMCNC: 28.8 PG — SIGNIFICANT CHANGE UP (ref 27–34)
MCHC RBC-ENTMCNC: 34 G/DL — SIGNIFICANT CHANGE UP (ref 32–36)
MCV RBC AUTO: 84.7 FL — SIGNIFICANT CHANGE UP (ref 80–100)
MONOCYTES # BLD AUTO: 0.47 K/UL — SIGNIFICANT CHANGE UP (ref 0–0.9)
MONOCYTES NFR BLD AUTO: 8.7 % — SIGNIFICANT CHANGE UP (ref 2–14)
NEUTROPHILS # BLD AUTO: 3.83 K/UL — SIGNIFICANT CHANGE UP (ref 1.8–7.4)
NEUTROPHILS NFR BLD AUTO: 70.5 % — SIGNIFICANT CHANGE UP (ref 43–77)
NRBC BLD AUTO-RTO: 0 /100 WBCS — SIGNIFICANT CHANGE UP (ref 0–0)
PLATELET # BLD AUTO: 178 K/UL — SIGNIFICANT CHANGE UP (ref 150–400)
RBC # BLD: 4.96 M/UL — SIGNIFICANT CHANGE UP (ref 4.2–5.8)
RBC # FLD: 13.5 % — SIGNIFICANT CHANGE UP (ref 10.3–14.5)
WBC # BLD: 5.43 K/UL — SIGNIFICANT CHANGE UP (ref 3.8–10.5)
WBC # FLD AUTO: 5.43 K/UL — SIGNIFICANT CHANGE UP (ref 3.8–10.5)

## 2025-03-19 ENCOUNTER — NON-APPOINTMENT (OUTPATIENT)
Age: 60
End: 2025-03-19

## 2025-03-19 LAB
T3FREE SERPL-MCNC: 2.95 PG/ML
T4 FREE SERPL-MCNC: 0.9 NG/DL
TSH SERPL-ACNC: 4.62 UIU/ML

## 2025-04-04 ENCOUNTER — APPOINTMENT (OUTPATIENT)
Dept: PHYSICAL MEDICINE AND REHAB | Facility: CLINIC | Age: 60
End: 2025-04-04
Payer: COMMERCIAL

## 2025-04-04 DIAGNOSIS — M79.2 NEURALGIA AND NEURITIS, UNSPECIFIED: ICD-10-CM

## 2025-04-04 DIAGNOSIS — I89.0 LYMPHEDEMA, NOT ELSEWHERE CLASSIFIED: ICD-10-CM

## 2025-04-04 PROCEDURE — 99214 OFFICE O/P EST MOD 30 MIN: CPT

## 2025-04-04 RX ORDER — GABAPENTIN 100 MG/1
100 CAPSULE ORAL
Qty: 120 | Refills: 1 | Status: ACTIVE | COMMUNITY
Start: 2025-04-04 | End: 1900-01-01

## 2025-04-08 ENCOUNTER — APPOINTMENT (OUTPATIENT)
Dept: OTOLARYNGOLOGY | Facility: CLINIC | Age: 60
End: 2025-04-08
Payer: COMMERCIAL

## 2025-04-08 VITALS
DIASTOLIC BLOOD PRESSURE: 70 MMHG | HEIGHT: 70 IN | HEART RATE: 79 BPM | BODY MASS INDEX: 27.35 KG/M2 | SYSTOLIC BLOOD PRESSURE: 110 MMHG | WEIGHT: 191 LBS

## 2025-04-08 DIAGNOSIS — C09.9 MALIGNANT NEOPLASM OF TONSIL, UNSPECIFIED: ICD-10-CM

## 2025-04-08 DIAGNOSIS — E03.9 HYPOTHYROIDISM, UNSPECIFIED: ICD-10-CM

## 2025-04-08 PROCEDURE — 99212 OFFICE O/P EST SF 10 MIN: CPT | Mod: 25

## 2025-04-08 PROCEDURE — 31575 DIAGNOSTIC LARYNGOSCOPY: CPT

## 2025-05-02 ENCOUNTER — APPOINTMENT (OUTPATIENT)
Dept: PHYSICAL MEDICINE AND REHAB | Facility: CLINIC | Age: 60
End: 2025-05-02

## 2025-05-02 DIAGNOSIS — M79.2 NEURALGIA AND NEURITIS, UNSPECIFIED: ICD-10-CM

## 2025-05-02 DIAGNOSIS — G62.9 POLYNEUROPATHY, UNSPECIFIED: ICD-10-CM

## 2025-05-02 DIAGNOSIS — I89.0 LYMPHEDEMA, NOT ELSEWHERE CLASSIFIED: ICD-10-CM

## 2025-05-02 PROCEDURE — 99214 OFFICE O/P EST MOD 30 MIN: CPT

## 2025-05-02 RX ORDER — GABAPENTIN 100 MG/1
100 CAPSULE ORAL
Qty: 90 | Refills: 2 | Status: ACTIVE | COMMUNITY
Start: 2025-05-02 | End: 1900-01-01

## 2025-05-23 ENCOUNTER — NON-APPOINTMENT (OUTPATIENT)
Age: 60
End: 2025-05-23

## 2025-05-23 ENCOUNTER — APPOINTMENT (OUTPATIENT)
Dept: CARDIOLOGY | Facility: CLINIC | Age: 60
End: 2025-05-23

## 2025-05-23 VITALS
HEART RATE: 69 BPM | DIASTOLIC BLOOD PRESSURE: 86 MMHG | WEIGHT: 196 LBS | BODY MASS INDEX: 28.06 KG/M2 | HEIGHT: 70 IN | RESPIRATION RATE: 16 BRPM | SYSTOLIC BLOOD PRESSURE: 128 MMHG

## 2025-05-23 DIAGNOSIS — R94.31 ABNORMAL ELECTROCARDIOGRAM [ECG] [EKG]: ICD-10-CM

## 2025-05-23 DIAGNOSIS — G62.9 POLYNEUROPATHY, UNSPECIFIED: ICD-10-CM

## 2025-05-23 DIAGNOSIS — I77.810 THORACIC AORTIC ECTASIA: ICD-10-CM

## 2025-05-23 DIAGNOSIS — G31.84 MILD COGNITIVE IMPAIRMENT, SO STATED: ICD-10-CM

## 2025-05-23 PROCEDURE — 99214 OFFICE O/P EST MOD 30 MIN: CPT | Mod: 25

## 2025-05-23 PROCEDURE — 93000 ELECTROCARDIOGRAM COMPLETE: CPT

## 2025-06-13 ENCOUNTER — APPOINTMENT (OUTPATIENT)
Dept: PHYSICAL MEDICINE AND REHAB | Facility: CLINIC | Age: 60
End: 2025-06-13
Payer: COMMERCIAL

## 2025-06-13 PROCEDURE — 99214 OFFICE O/P EST MOD 30 MIN: CPT

## 2025-09-05 ENCOUNTER — APPOINTMENT (OUTPATIENT)
Dept: PHYSICAL MEDICINE AND REHAB | Facility: CLINIC | Age: 60
End: 2025-09-05

## 2025-09-05 DIAGNOSIS — I89.0 LYMPHEDEMA, NOT ELSEWHERE CLASSIFIED: ICD-10-CM

## 2025-09-05 DIAGNOSIS — M79.2 NEURALGIA AND NEURITIS, UNSPECIFIED: ICD-10-CM

## 2025-09-05 DIAGNOSIS — G62.9 POLYNEUROPATHY, UNSPECIFIED: ICD-10-CM

## 2025-09-05 PROCEDURE — 99214 OFFICE O/P EST MOD 30 MIN: CPT

## 2025-09-05 RX ORDER — GABAPENTIN 100 MG/1
100 CAPSULE ORAL
Qty: 60 | Refills: 2 | Status: ACTIVE | COMMUNITY
Start: 2025-09-05 | End: 1900-01-01